# Patient Record
Sex: FEMALE | Race: WHITE | Employment: PART TIME | URBAN - NONMETROPOLITAN AREA
[De-identification: names, ages, dates, MRNs, and addresses within clinical notes are randomized per-mention and may not be internally consistent; named-entity substitution may affect disease eponyms.]

---

## 2020-07-27 ENCOUNTER — TELEPHONE (OUTPATIENT)
Dept: FAMILY MEDICINE CLINIC | Age: 45
End: 2020-07-27

## 2020-07-27 DIAGNOSIS — G43.909 MIGRAINE WITHOUT STATUS MIGRAINOSUS, NOT INTRACTABLE, UNSPECIFIED MIGRAINE TYPE: Primary | ICD-10-CM

## 2020-07-27 RX ORDER — TOPIRAMATE 25 MG/1
TABLET ORAL
Qty: 90 TAB | Refills: 3 | Status: SHIPPED | OUTPATIENT
Start: 2020-07-27 | End: 2020-07-27 | Stop reason: SDUPTHER

## 2020-07-28 RX ORDER — TOPIRAMATE 25 MG/1
25 TABLET ORAL DAILY
Qty: 90 TAB | Refills: 1 | Status: SHIPPED | OUTPATIENT
Start: 2020-07-28 | End: 2020-12-15

## 2020-09-29 ENCOUNTER — OFFICE VISIT (OUTPATIENT)
Dept: FAMILY MEDICINE CLINIC | Age: 45
End: 2020-09-29
Payer: MEDICAID

## 2020-09-29 DIAGNOSIS — R52 BODY ACHES: ICD-10-CM

## 2020-09-29 DIAGNOSIS — Z20.822 SUSPECTED COVID-19 VIRUS INFECTION: ICD-10-CM

## 2020-09-29 DIAGNOSIS — U07.1 COVID-19: Primary | ICD-10-CM

## 2020-09-29 PROCEDURE — 99213 OFFICE O/P EST LOW 20 MIN: CPT | Performed by: INTERNAL MEDICINE

## 2020-09-29 NOTE — PROGRESS NOTES
Roxanngladys Connor presents today for   Chief Complaint   Patient presents with    Concern For COVID-19 (Coronavirus)     loiss of smell and taste , congestion, sneezing, vomiting    Sore Throat    Cough    Headache    Shortness of Breath       Is someone accompanying this pt? no    Is the patient using any DME equipment during OV? no    Health Maintenance reviewed and discussed and ordered per Provider. Health Maintenance Due   Topic Date Due    DTaP/Tdap/Td series (1 - Tdap) 08/30/1995    PAP AKA CERVICAL CYTOLOGY  08/30/1995    Lipid Screen  08/30/2014    Flu Vaccine (1) 09/01/2020   . Coordination of Care:  1. Have you been to the ER, urgent care clinic since your last visit? Hospitalized since your last visit? No     2. Have you seen or consulted any other health care providers outside of the [address] [place] [address] since your last visit? Include any pap smears or colon screening.  no

## 2020-09-29 NOTE — PROGRESS NOTES
This is a very pleasant patient who was seen in clinic today for an acute visit. Assessment/Plan:     1. Cough/Covid 19  -Patient has already been using NyQuil and DayQuil and drinking plenty of fluids. She is not having any chest pain shortness of breath or other symptoms to suggest a severe course. At this time we will check a COVID swab on her and quarantine her at home for the next 10 days  Reviewed with him that COVID-19 pandemic is an evolving situation with rapidly changing recommendations & guidelines. Medical decisions are made based on the the best information available at the time. Recommended he stay tuned for updates published by trusted sources and to advise your PCP of any unexpected changes in clinical condition       Cc: cough, body aches   Subjective:   HPI     Pleasant 79-year-old female who was exposed to a known positive about a week ago. She is subsequently developed the acute onset of loss of smell scratchy throat body aches fevers and chills. She is also been experiencing a little bit of nausea as well. She has no other new sick contacts at home she was the first to develop symptoms. Recent Travel Screening and Travel History documentation      ROS  - GEN: no weight gain/loss, +fevers or chills  - HEENT: no vision changes, no tinnitus, no sore throat  - CV: no cp, palpitations or edema  - RESP: no sob, cough  - ABD: no n/v/d, no blood in stool  - : no dysuria or changes in freq. - SKIN: no rashes, ulcers  - Neuro: no resting tremors, parasthesia in extremities, no headaches  - MS: No weakness in extremities, no gait abnormalities  - Other    Temp 97, hr 96, oxygen 99     Physical Exam  Constitutional:       Appearance: Normal appearance. NAD and pleasant  HENT:      Head: Normocephalic. Nose: Nose normal.    Eyes:      Extraocular Movements: Extraocular movements intact.       Conjunctiva/sclera: Conjunctivae normal. Sclera anicteric     Pupils: Pupils are equal, round, and reactive to light. Cardiovascular:      Rate and Rhythm: tachycardic. Pulses: Normal pulses. Pulmonary:      Effort: No respiratory distress. Breath sounds: CTAB and No stridor. No rhonchi. Current Outpatient Medications on File Prior to Visit   Medication Sig Dispense Refill    topiramate (TOPAMAX) 25 mg tablet Take 1 Tab by mouth daily. 90 Tab 1     No current facility-administered medications on file prior to visit. Disclaimer:  Discussed expected course/resolution/complications of diagnosis in detail with patient. Medication risks/benefits/costs/interactions/alternatives discussed with patient. He was given an after visit summary which includes diagnoses, current medications, & vitals. He expressed understanding with the diagnosis and plan.

## 2020-10-01 ENCOUNTER — TELEPHONE (OUTPATIENT)
Dept: FAMILY MEDICINE CLINIC | Age: 45
End: 2020-10-01

## 2020-10-01 LAB — SARS-COV-2, NAA: NOT DETECTED

## 2020-10-20 RX ORDER — ONDANSETRON 8 MG/1
TABLET, ORALLY DISINTEGRATING ORAL
Qty: 30 TAB | Refills: 5 | Status: SHIPPED | OUTPATIENT
Start: 2020-10-20 | End: 2022-02-14

## 2020-10-21 RX ORDER — OMEPRAZOLE 40 MG/1
CAPSULE, DELAYED RELEASE ORAL
Qty: 30 CAP | Refills: 5 | Status: SHIPPED | OUTPATIENT
Start: 2020-10-21 | End: 2020-12-15 | Stop reason: SDUPTHER

## 2020-11-11 ENCOUNTER — VIRTUAL VISIT (OUTPATIENT)
Dept: FAMILY MEDICINE CLINIC | Age: 45
End: 2020-11-11
Payer: MEDICAID

## 2020-11-11 DIAGNOSIS — R07.9 CHEST PAIN, UNSPECIFIED TYPE: ICD-10-CM

## 2020-11-11 DIAGNOSIS — F41.9 ANXIETY: ICD-10-CM

## 2020-11-11 DIAGNOSIS — F32.9 MAJOR DEPRESSIVE DISORDER WITH CURRENT ACTIVE EPISODE, UNSPECIFIED DEPRESSION EPISODE SEVERITY, UNSPECIFIED WHETHER RECURRENT: Primary | ICD-10-CM

## 2020-11-11 DIAGNOSIS — F41.1 GAD (GENERALIZED ANXIETY DISORDER): ICD-10-CM

## 2020-11-11 PROCEDURE — 99443 PR PHYS/QHP TELEPHONE EVALUATION 21-30 MIN: CPT | Performed by: NURSE PRACTITIONER

## 2020-11-11 RX ORDER — FLUOXETINE HYDROCHLORIDE 20 MG/1
20 CAPSULE ORAL
Qty: 30 CAP | Refills: 0 | Status: SHIPPED | OUTPATIENT
Start: 2020-11-11 | End: 2020-12-11 | Stop reason: SDUPTHER

## 2020-11-11 RX ORDER — BUSPIRONE HYDROCHLORIDE 7.5 MG/1
7.5 TABLET ORAL 2 TIMES DAILY
Qty: 60 TAB | Refills: 0 | Status: SHIPPED | OUTPATIENT
Start: 2020-11-11 | End: 2020-12-11

## 2020-11-11 NOTE — PROGRESS NOTES
Frida Martines presents today for   Chief Complaint   Patient presents with    Anxiety         Depression Screening:  3 most recent PHQ Screens 11/11/2020   Little interest or pleasure in doing things Nearly every day   Feeling down, depressed, irritable, or hopeless Nearly every day   Total Score PHQ 2 6   Trouble falling or staying asleep, or sleeping too much Nearly every day   Feeling tired or having little energy Nearly every day   Poor appetite, weight loss, or overeating Nearly every day   Feeling bad about yourself - or that you are a failure or have let yourself or your family down Nearly every day   Trouble concentrating on things such as school, work, reading, or watching TV Nearly every day   Moving or speaking so slowly that other people could have noticed; or the opposite being so fidgety that others notice Nearly every day   Thoughts of being better off dead, or hurting yourself in some way Not at all   PHQ 9 Score 24   How difficult have these problems made it for you to do your work, take care of your home and get along with others Extremely difficult       Learning Assessment:  Learning Assessment 11/11/2020   PRIMARY LEARNER Patient   PRIMARY LANGUAGE ENGLISH   LEARNER PREFERENCE PRIMARY DEMONSTRATION   ANSWERED BY patient   RELATIONSHIP SELF       Health Maintenance reviewed and discussed and ordered per Provider. Health Maintenance Due   Topic Date Due    DTaP/Tdap/Td series (1 - Tdap) 08/30/1995    PAP AKA CERVICAL CYTOLOGY  08/30/1995    Lipid Screen  08/30/2014    Flu Vaccine (1) 09/01/2020   . Coordination of Care:  1. Have you been to the ER, urgent care clinic since your last visit? Hospitalized since your last visit? no    2. Have you seen or consulted any other health care providers outside of the [address] [place] [address] since your last visit? Include any pap smears or colon screening.  no    Providers orders his own labs, orders for colonoscopy, mammograms and referrals as Results for orders placed or performed in visit on 03/19/18   AMB POC HEMOGLOBIN (HGB)   Result Value Ref Range    Hemoglobin (POC) 12.1 needed    Last UDS Checked no  Last Pain contract signed: no    Patient is having a lot of depression at this time and needs to talk about getting help.

## 2020-12-07 ENCOUNTER — HOSPITAL ENCOUNTER (OUTPATIENT)
Dept: LAB | Age: 45
Discharge: HOME | End: 2020-12-07

## 2020-12-07 ENCOUNTER — HOSPITAL ENCOUNTER (OUTPATIENT)
Dept: NON INVASIVE DIAGNOSTICS | Age: 45
Discharge: HOME | End: 2020-12-07
Payer: MEDICAID

## 2020-12-07 VITALS
SYSTOLIC BLOOD PRESSURE: 121 MMHG | WEIGHT: 156 LBS | DIASTOLIC BLOOD PRESSURE: 81 MMHG | BODY MASS INDEX: 25.99 KG/M2 | HEIGHT: 65 IN

## 2020-12-07 DIAGNOSIS — R07.9 CHEST PAIN, UNSPECIFIED TYPE: ICD-10-CM

## 2020-12-07 DIAGNOSIS — F41.9 ANXIETY: ICD-10-CM

## 2020-12-07 PROBLEM — K21.9 GERD WITHOUT ESOPHAGITIS: Status: ACTIVE | Noted: 2020-12-07

## 2020-12-07 LAB
ATRIAL RATE: 64 BPM
CALCULATED P AXIS, ECG09: 22 DEGREES
CALCULATED R AXIS, ECG10: 44 DEGREES
CALCULATED T AXIS, ECG11: 40 DEGREES
DIAGNOSIS, 93000: NORMAL
P-R INTERVAL, ECG05: 164 MS
Q-T INTERVAL, ECG07: 396 MS
QRS DURATION, ECG06: 70 MS
QTC CALCULATION (BEZET), ECG08: 408 MS
VENTRICULAR RATE, ECG03: 64 BPM

## 2020-12-07 PROCEDURE — 93005 ELECTROCARDIOGRAM TRACING: CPT

## 2020-12-09 DIAGNOSIS — F41.1 GAD (GENERALIZED ANXIETY DISORDER): ICD-10-CM

## 2020-12-09 NOTE — TELEPHONE ENCOUNTER
Patient wanted to call and let you know that her medication has been working. Patient requested refills of medication. Pharmacy on file verified.

## 2020-12-11 ENCOUNTER — TELEPHONE (OUTPATIENT)
Dept: FAMILY MEDICINE CLINIC | Age: 45
End: 2020-12-11

## 2020-12-11 DIAGNOSIS — F32.9 MAJOR DEPRESSIVE DISORDER WITH CURRENT ACTIVE EPISODE, UNSPECIFIED DEPRESSION EPISODE SEVERITY, UNSPECIFIED WHETHER RECURRENT: ICD-10-CM

## 2020-12-11 RX ORDER — FLUOXETINE HYDROCHLORIDE 20 MG/1
20 CAPSULE ORAL
Qty: 30 CAP | Refills: 0 | Status: SHIPPED | OUTPATIENT
Start: 2020-12-11 | End: 2020-12-15 | Stop reason: SDUPTHER

## 2020-12-11 RX ORDER — BUSPIRONE HYDROCHLORIDE 7.5 MG/1
7.5 TABLET ORAL 2 TIMES DAILY
Qty: 60 TAB | Refills: 0 | OUTPATIENT
Start: 2020-12-11 | End: 2021-01-10

## 2020-12-15 ENCOUNTER — VIRTUAL VISIT (OUTPATIENT)
Dept: FAMILY MEDICINE CLINIC | Age: 45
End: 2020-12-15
Payer: MEDICAID

## 2020-12-15 DIAGNOSIS — M50.30 DEGENERATION OF CERVICAL INTERVERTEBRAL DISC: ICD-10-CM

## 2020-12-15 DIAGNOSIS — K21.9 GERD WITHOUT ESOPHAGITIS: ICD-10-CM

## 2020-12-15 DIAGNOSIS — F41.1 GAD (GENERALIZED ANXIETY DISORDER): ICD-10-CM

## 2020-12-15 DIAGNOSIS — F32.9 MAJOR DEPRESSIVE DISORDER WITH CURRENT ACTIVE EPISODE, UNSPECIFIED DEPRESSION EPISODE SEVERITY, UNSPECIFIED WHETHER RECURRENT: Primary | ICD-10-CM

## 2020-12-15 PROCEDURE — 99443 PR PHYS/QHP TELEPHONE EVALUATION 21-30 MIN: CPT | Performed by: NURSE PRACTITIONER

## 2020-12-15 RX ORDER — CYCLOBENZAPRINE HCL 10 MG
10 TABLET ORAL
Qty: 60 TAB | Refills: 0 | Status: CANCELLED | OUTPATIENT
Start: 2020-12-15

## 2020-12-15 RX ORDER — BUSPIRONE HYDROCHLORIDE 7.5 MG/1
7.5 TABLET ORAL 2 TIMES DAILY
COMMUNITY
End: 2020-12-15 | Stop reason: SDUPTHER

## 2020-12-15 RX ORDER — OMEPRAZOLE 40 MG/1
CAPSULE, DELAYED RELEASE ORAL
Qty: 30 CAP | Refills: 5 | Status: SHIPPED | OUTPATIENT
Start: 2020-12-15 | End: 2021-06-25

## 2020-12-15 RX ORDER — FLUOXETINE HYDROCHLORIDE 20 MG/1
20 CAPSULE ORAL
Qty: 30 CAP | Refills: 5 | Status: SHIPPED | OUTPATIENT
Start: 2020-12-15 | End: 2022-02-14

## 2020-12-15 RX ORDER — BUSPIRONE HYDROCHLORIDE 7.5 MG/1
7.5 TABLET ORAL 2 TIMES DAILY
Qty: 60 TAB | Refills: 5 | Status: SHIPPED | OUTPATIENT
Start: 2020-12-15 | End: 2021-06-07

## 2020-12-15 NOTE — PROGRESS NOTES
Delmy Orosco presents today for   Chief Complaint   Patient presents with    GERD     f/u    Anxiety       Depression Screening:  3 most recent PHQ Screens 11/11/2020   Little interest or pleasure in doing things Nearly every day   Feeling down, depressed, irritable, or hopeless Nearly every day   Total Score PHQ 2 6   Trouble falling or staying asleep, or sleeping too much Nearly every day   Feeling tired or having little energy Nearly every day   Poor appetite, weight loss, or overeating Nearly every day   Feeling bad about yourself - or that you are a failure or have let yourself or your family down Nearly every day   Trouble concentrating on things such as school, work, reading, or watching TV Nearly every day   Moving or speaking so slowly that other people could have noticed; or the opposite being so fidgety that others notice Nearly every day   Thoughts of being better off dead, or hurting yourself in some way Not at all   PHQ 9 Score 24   How difficult have these problems made it for you to do your work, take care of your home and get along with others Extremely difficult       Learning Assessment:  Learning Assessment 11/11/2020   PRIMARY LEARNER Patient   PRIMARY LANGUAGE ENGLISH   LEARNER PREFERENCE PRIMARY DEMONSTRATION   ANSWERED BY patient   RELATIONSHIP SELF       Health Maintenance reviewed and discussed and ordered per Provider. Health Maintenance Due   Topic Date Due    PAP AKA CERVICAL CYTOLOGY  08/30/1995    Lipid Screen  08/30/2014    Flu Vaccine (1) 09/01/2020   . Coordination of Care:  1. Have you been to the ER, urgent care clinic since your last visit? Hospitalized since your last visit? no    2. Have you seen or consulted any other health care providers outside of the [address] [place] [address] since your last visit? Include any pap smears or colon screening.  no

## 2020-12-15 NOTE — PROGRESS NOTES
I am seeing this patient today virtually using HIPAA-compliant video-conferencing technology due to the COVID-19 Pandemic. The patient has previously provided full consent to use this technology and understands the risks and benefits of proceeding. I am seeing the patient today from my office in [facility]. The patient is in his/her home located within [place]. Patient already made aware that this is a virtual visit with provider and that for the purposes of billing, we will submit a claim for reimbursement with your insurance company. He/She was informed that he/she will be responsible for any copays, coinsurance amounts or other amounts not covered by his/her insurance company. Patient consented and accepted terms of virtual visit. THIS VISIT WAS CONDUCTED OVER THE TELEPHONE    HPI    Kristen Doe is a 54 y.o. female and presents today for GERD (f/u) and Anxiety  A month ago we started her on buspirone and fluoxetine for depression/anxiety. She reports doing well with both medications. She denies side effects. She denies thoughts of suicide. Need refills. She takes ppi for gerd; she states that other than those 3 things, she takes no other medication. Allergies    Allergies   Allergen Reactions    Sulfa (Sulfonamide Antibiotics) Other (comments)     Abdominal pain        Medications    Current Outpatient Medications   Medication Sig Dispense    busPIRone (BUSPAR) 7.5 mg tablet Take 1 Tab by mouth two (2) times a day. 60 Tab    FLUoxetine (PROzac) 20 mg capsule Take 1 Cap by mouth nightly. 30 Cap    omeprazole (PRILOSEC) 40 mg capsule TAKE ONE CAPSULE BY MOUTH DAILY 30 Cap    ondansetron (ZOFRAN ODT) 8 mg disintegrating tablet PLACE 1 TABLET ON TOP OF TONGUE WHERE IT WILL DISSOLVE, THEN SWALLOW THREE TIMES DAILY FOR 10 DAYS 30 Tab     No current facility-administered medications for this visit.          Screening  Current dx; stable    Health Maintenance    Health Maintenance Due   Topic Date Due    PAP AKA CERVICAL CYTOLOGY  1995    Lipid Screen  2014    Flu Vaccine (1) 2020        Problem List    Patient Active Problem List    Diagnosis Date Noted    Degeneration of cervical intervertebral disc 12/15/2020    GERD without esophagitis 2020    CRISTOFER (generalized anxiety disorder) 2020    Major depressive disorder with current active episode 2020        Family Hx    Family History   Problem Relation Age of Onset    No Known Problems Mother     Alcohol abuse Father         Social Hx    Social History     Socioeconomic History    Marital status:      Spouse name: Not on file    Number of children: Not on file    Years of education: Not on file    Highest education level: Not on file   Tobacco Use    Smoking status: Former Smoker     Packs/day: 0.25     Last attempt to quit:      Years since quittin.9    Smokeless tobacco: Never Used   Substance and Sexual Activity    Alcohol use: Never     Frequency: Never     Comment: social        Surgical Hx    Past Surgical History:   Procedure Laterality Date    HX CHOLECYSTECTOMY      HX HYSTERECTOMY  2019        Vitals    There were no vitals taken for this visit. No flowsheet data found. ROS    Review of Systems   Constitutional: Negative for chills, fever and malaise/fatigue. HENT: Negative for congestion, ear pain, sinus pain and sore throat. Eyes: Negative for blurred vision and redness. Respiratory: Negative for cough, sputum production, shortness of breath and wheezing. Cardiovascular: Negative for chest pain, palpitations and leg swelling. Gastrointestinal: Negative for abdominal pain, constipation, diarrhea, heartburn, nausea and vomiting. Genitourinary: Negative for dysuria and hematuria. Musculoskeletal: Positive for joint pain. Negative for falls and myalgias. Skin: Negative for rash.    Neurological: Negative for dizziness, seizures, weakness and headaches. Endo/Heme/Allergies: Does not bruise/bleed easily. Psychiatric/Behavioral: Positive for depression. Negative for suicidal ideas. The patient is nervous/anxious. The patient does not have insomnia. Physical Exam    Patient is a 54y.o. year old female     Physical exam was deferred due to [place]- 19 precautions as visit was completed via telemedicine. Assessment/Plan  1. Major depressive disorder with current active episode, unspecified depression episode severity, unspecified whether recurrent  Patient to continue current medication regimen and was advised to call 911 if there are any developments of suicidal/homicidal ideation. Patient verbalized understanding.   - FLUoxetine (PROzac) 20 mg capsule; Take 1 Cap by mouth nightly. Dispense: 30 Cap; Refill: 5    2. Degeneration of cervical intervertebral disc  Stable at this time; is no longer taking Topamax and does not use Flexeril    3. CRISTOFER (generalized anxiety disorder)  Patient to continue current medication regimen and was advised to call 911 if there are any developments of suicidal/homicidal ideation. Patient verbalized understanding.   - busPIRone (BUSPAR) 7.5 mg tablet; Take 1 Tab by mouth two (2) times a day. Dispense: 60 Tab; Refill: 5    4. GERD without esophagitis  Continue current PPI therapy; Discussed at length to avoid very acidic foods, alcohol, fatty food, onions, mints and caffeine as well as foods that aggravate symptoms. The patient was advised to also avoid eating late before going to bed, and would sleep with head elevated. - omeprazole (PRILOSEC) 40 mg capsule; TAKE ONE CAPSULE BY MOUTH DAILY  Dispense: 30 Cap; Refill: 5         Health Maintenance Items reviewed with patient as noted. 21 minutes spent with patient/reviewing records during virtual appt discussing diagnoses, treatment options, and answering any patient questions.     Jigar Baez, MSN, FNP-BC   [facility]

## 2021-01-20 ENCOUNTER — TELEPHONE (OUTPATIENT)
Dept: FAMILY MEDICINE CLINIC | Age: 46
End: 2021-01-20

## 2021-02-09 ENCOUNTER — VIRTUAL VISIT (OUTPATIENT)
Dept: FAMILY MEDICINE CLINIC | Age: 46
End: 2021-02-09
Payer: MEDICAID

## 2021-02-09 DIAGNOSIS — E66.9 CLASS 1 OBESITY WITH BODY MASS INDEX (BMI) OF 30.0 TO 30.9 IN ADULT, UNSPECIFIED OBESITY TYPE, UNSPECIFIED WHETHER SERIOUS COMORBIDITY PRESENT: ICD-10-CM

## 2021-02-09 DIAGNOSIS — R63.4 WEIGHT LOSS: Primary | ICD-10-CM

## 2021-02-09 DIAGNOSIS — Z71.3 WEIGHT LOSS COUNSELING, ENCOUNTER FOR: ICD-10-CM

## 2021-02-09 PROCEDURE — 99443 PR PHYS/QHP TELEPHONE EVALUATION 21-30 MIN: CPT | Performed by: NURSE PRACTITIONER

## 2021-02-09 RX ORDER — PHENTERMINE HYDROCHLORIDE 37.5 MG/1
37.5 TABLET ORAL
Qty: 30 TAB | Refills: 0 | Status: SHIPPED | OUTPATIENT
Start: 2021-02-09 | End: 2021-03-09 | Stop reason: SDUPTHER

## 2021-02-09 NOTE — PROGRESS NOTES
I am seeing this patient today virtually using HIPAA-compliant video-conferencing technology due to the COVID-19 Pandemic. The patient has previously provided full consent to use this technology and understands the risks and benefits of proceeding. I am seeing the patient today from my office in [place], [place]. The patient is in his/her home located within [place]. Patient already made aware that this is a virtual visit/telehealth with provider and that for the purposes of billing, we will submit a claim for reimbursement with your insurance company. He/She was informed that he/she will be responsible for any copays, coinsurance amounts or other amounts not covered by his/her insurance company. Patient consented and accepted terms of virtual visit. THIS VISIT WAS CONDUCTED OVER THE TELEPHONE    HPI  Angel Mercer is a 54 y.o. female and presents today for Weight Loss (clinic)  Presents today for wanting to lose weight; she is used phentermine intermittently in the past for weight loss. She is wanting to go back on it at this time. Her goal is to lose 20lbs  Ht: 5'4  Allergies  Allergies   Allergen Reactions    Sulfa (Sulfonamide Antibiotics) Other (comments)     Abdominal pain        Medications  Current Outpatient Medications   Medication Sig Dispense    phentermine (ADIPEX-P) 37.5 mg tablet Take 1 Tab by mouth every morning. Max Daily Amount: 37.5 mg. 30 Tab    busPIRone (BUSPAR) 7.5 mg tablet Take 1 Tab by mouth two (2) times a day. 60 Tab    FLUoxetine (PROzac) 20 mg capsule Take 1 Cap by mouth nightly. 30 Cap    omeprazole (PRILOSEC) 40 mg capsule TAKE ONE CAPSULE BY MOUTH DAILY 30 Cap    ondansetron (ZOFRAN ODT) 8 mg disintegrating tablet PLACE 1 TABLET ON TOP OF TONGUE WHERE IT WILL DISSOLVE, THEN SWALLOW THREE TIMES DAILY FOR 10 DAYS 30 Tab     No current facility-administered medications for this visit. Screening  On the basis of positive PHQ-9 screening ( ), C-SSRS completed. Patient will follow-up as needed/as directed with PCP. Health Maintenance  Health Maintenance Due   Topic Date Due    PAP AKA CERVICAL CYTOLOGY  1995    Lipid Screen  2014    Flu Vaccine (1) 2020        Problem List  Patient Active Problem List    Diagnosis Date Noted    Weight loss 2021    Weight loss counseling, encounter for 2021    Class 1 obesity with body mass index (BMI) of 30.0 to 30.9 in adult 2021    Degeneration of cervical intervertebral disc 12/15/2020    GERD without esophagitis 2020    CRISTOFER (generalized anxiety disorder) 2020    Major depressive disorder with current active episode 2020        Family Hx  Family History   Problem Relation Age of Onset    No Known Problems Mother     Alcohol abuse Father         Social Hx  Social History     Socioeconomic History    Marital status:      Spouse name: Not on file    Number of children: Not on file    Years of education: Not on file    Highest education level: Not on file   Tobacco Use    Smoking status: Former Smoker     Packs/day: 0.25     Quit date:      Years since quittin.1    Smokeless tobacco: Never Used   Substance and Sexual Activity    Alcohol use: Never     Frequency: Never     Comment: social    Drug use: Never        Surgical Hx  Past Surgical History:   Procedure Laterality Date    HX CHOLECYSTECTOMY      HX HYSTERECTOMY  2019        Vitals  There were no vitals taken for this visit. Patient-Reported Vitals 2021   Patient-Reported Weight 177   Patient-Reported Pulse 95   Patient-Reported Systolic  071   Patient-Reported Diastolic 82        ROS  Review of Systems   Constitutional: Negative for chills, fever and malaise/fatigue. HENT: Negative for congestion, ear pain, sinus pain and sore throat. Eyes: Negative for blurred vision and redness. Respiratory: Negative for cough, sputum production, shortness of breath and wheezing. Cardiovascular: Negative for chest pain, palpitations and leg swelling. Gastrointestinal: Negative for abdominal pain, constipation, diarrhea, heartburn, nausea and vomiting. Genitourinary: Negative for dysuria and hematuria. Musculoskeletal: Negative for falls, joint pain and myalgias. Skin: Negative for rash. Neurological: Negative for dizziness, seizures, weakness and headaches. Endo/Heme/Allergies: Does not bruise/bleed easily. Psychiatric/Behavioral: Negative for depression and suicidal ideas. The patient does not have insomnia. Physical Exam  Patient is a 54y.o. year old female     Physical exam was deferred due to [place]- 19 precautions as visit was completed via telemedicine. Assessment/Plan  1. Weight loss  Discussed weight loss goals with patient , areas for improvement and lifestyle modifications; We discussed dietary modifications in significant detail; I have asked her to follow a low carb, low sugar diet. I have also asked her to continue HEP; We discussed how to appropriately use phentermine; SEs discussed with patient. She would like to proceed. Will Rx 30days (at a time); if she is showing progress, will cont 30days at a time; we discussed strict monitoring of her blood pressure, heart rate and weight loss. Should she experience any chest pain, SOB, she is to go to the ER. We will continue wt loss medication therapy for no longer than 4months. - phentermine (ADIPEX-P) 37.5 mg tablet; Take 1 Tab by mouth every morning. Max Daily Amount: 37.5 mg.  Dispense: 30 Tab; Refill: 0    2. Weight loss counseling, encounter for  Discussed weight loss goals with patient , areas for improvement and lifestyle modifications; We discussed dietary modifications in significant detail; I have asked her to follow a low carb, low sugar diet. I have also asked her to continue HEP; We discussed how to appropriately use phentermine; SEs discussed with patient.  She would like to proceed. Will Rx 30days (at a time); if she is showing progress, will cont 30days at a time; we discussed strict monitoring of her blood pressure, heart rate and weight loss. Should she experience any chest pain, SOB, she is to go to the ER. We will continue wt loss medication therapy for no longer than 4months. - phentermine (ADIPEX-P) 37.5 mg tablet; Take 1 Tab by mouth every morning. Max Daily Amount: 37.5 mg.  Dispense: 30 Tab; Refill: 0    3. Class 1 obesity with body mass index (BMI) of 30.0 to 30.9 in adult, unspecified obesity type, unspecified whether serious comorbidity present  BMI 30.4  Healthy diet/nutrition reviewed at length. Limit fast foods, FFF, soft drinks, late night eating, watch portion control. Smaller, more frequent meals/day. Work to increase daily physical activity as tolerated-goal 30 minutes at least 5 days a week. - phentermine (ADIPEX-P) 37.5 mg tablet; Take 1 Tab by mouth every morning. Max Daily Amount: 37.5 mg.  Dispense: 30 Tab; Refill: 0         Health Maintenance Items reviewed with patient; updates completed if applicable. >21 minutes spent with patient/reviewing records during virtual appt discussing diagnoses, treatment options, and answering any patient questions. Disclaimer:  I have discussed the diagnosis with the patient and the intended plan as seen above. The patient understands our medical plan. The risks, benefits and significant side effects of all medications have been reviewed. Anticipated time course and progression of condition reviewed. All questions have been addressed. He received an after visit summary, with information reviewed, and questions answered. Where appropriate, he/she is instructed to call the clinic if he/she has not been notified either by phone or through [address] with the results of his/her tests or with an appointment plan for any referrals within 1 week(s).  The patient  is to call if his condition worsens or fails to improve or if significant side effects are experienced.      Mickie Nicholas, FNP-BC

## 2021-02-09 NOTE — PROGRESS NOTES
Lobo Parham presents today for   Chief Complaint   Patient presents with    Weight Loss     clinic       Depression Screening:  3 most recent PHQ Screens 2/9/2021   Little interest or pleasure in doing things Not at all   Feeling down, depressed, irritable, or hopeless Not at all   Total Score PHQ 2 0   Trouble falling or staying asleep, or sleeping too much -   Feeling tired or having little energy -   Poor appetite, weight loss, or overeating -   Feeling bad about yourself - or that you are a failure or have let yourself or your family down -   Trouble concentrating on things such as school, work, reading, or watching TV -   Moving or speaking so slowly that other people could have noticed; or the opposite being so fidgety that others notice -   Thoughts of being better off dead, or hurting yourself in some way -   PHQ 9 Score -   How difficult have these problems made it for you to do your work, take care of your home and get along with others -       Learning Assessment:  Learning Assessment 11/11/2020   PRIMARY LEARNER Patient   PRIMARY LANGUAGE ENGLISH   LEARNER PREFERENCE PRIMARY DEMONSTRATION   ANSWERED BY patient   RELATIONSHIP SELF       Health Maintenance reviewed and discussed and ordered per Provider. Health Maintenance Due   Topic Date Due    PAP AKA CERVICAL CYTOLOGY  08/30/1995    Lipid Screen  08/30/2014    Flu Vaccine (1) 09/01/2020   . Coordination of Care:  1. Have you been to the ER, urgent care clinic since your last visit? Hospitalized since your last visit? no    2. Have you seen or consulted any other health care providers outside of the [address] [place] [address] since your last visit? Include any pap smears or colon screening.  no

## 2021-03-09 ENCOUNTER — VIRTUAL VISIT (OUTPATIENT)
Dept: FAMILY MEDICINE CLINIC | Age: 46
End: 2021-03-09
Payer: MEDICAID

## 2021-03-09 DIAGNOSIS — Z71.3 WEIGHT LOSS COUNSELING, ENCOUNTER FOR: ICD-10-CM

## 2021-03-09 DIAGNOSIS — R63.4 WEIGHT LOSS: Primary | ICD-10-CM

## 2021-03-09 DIAGNOSIS — E66.9 CLASS 1 OBESITY WITH BODY MASS INDEX (BMI) OF 30.0 TO 30.9 IN ADULT, UNSPECIFIED OBESITY TYPE, UNSPECIFIED WHETHER SERIOUS COMORBIDITY PRESENT: ICD-10-CM

## 2021-03-09 PROCEDURE — 99443 PR PHYS/QHP TELEPHONE EVALUATION 21-30 MIN: CPT | Performed by: NURSE PRACTITIONER

## 2021-03-09 RX ORDER — PHENTERMINE HYDROCHLORIDE 37.5 MG/1
37.5 TABLET ORAL
Qty: 30 TAB | Refills: 0 | Status: SHIPPED | OUTPATIENT
Start: 2021-03-09 | End: 2021-04-13 | Stop reason: SDUPTHER

## 2021-03-09 NOTE — PROGRESS NOTES
I am seeing this patient today virtually using HIPAA-compliant video-conferencing technology due to the COVID-19 Pandemic. The patient has previously provided full consent to use this technology and understands the risks and benefits of proceeding. I am seeing the patient today from my office in [place], [place]. The patient is in his/her home located within [place]. Patient already made aware that this is a virtual visit/telehealth with provider and that for the purposes of billing, we will submit a claim for reimbursement with your insurance company. He/She was informed that he/she will be responsible for any copays, coinsurance amounts or other amounts not covered by his/her insurance company. Patient consented and accepted terms of virtual visit. THIS VISIT WAS CONDUCTED OVER THE TELEPHONE    HPI  Martha Pretty is a 54 y.o. female and presents today for Weight Loss  Patient presents for weight loss follow-up. She has lost about 6 to 7 pounds since last visit. She is doing well with phentermine. She denies any side effects or problems. She has no complaints today. She would Like to continue medication. Allergies  Allergies   Allergen Reactions    Sulfa (Sulfonamide Antibiotics) Other (comments)     Abdominal pain        Medications  Current Outpatient Medications   Medication Sig Dispense    phentermine (ADIPEX-P) 37.5 mg tablet Take 1 Tab by mouth every morning. Max Daily Amount: 37.5 mg. 30 Tab    busPIRone (BUSPAR) 7.5 mg tablet Take 1 Tab by mouth two (2) times a day. 60 Tab    FLUoxetine (PROzac) 20 mg capsule Take 1 Cap by mouth nightly. 30 Cap    omeprazole (PRILOSEC) 40 mg capsule TAKE ONE CAPSULE BY MOUTH DAILY 30 Cap    ondansetron (ZOFRAN ODT) 8 mg disintegrating tablet PLACE 1 TABLET ON TOP OF TONGUE WHERE IT WILL DISSOLVE, THEN SWALLOW THREE TIMES DAILY FOR 10 DAYS 30 Tab     No current facility-administered medications for this visit.          Screening  On the basis of positive PHQ-9 screening (PHQ 9 Score: 0), C-SSRS completed. Patient will follow-up as needed/as directed with PCP. Health Maintenance  Health Maintenance Due   Topic Date Due    PAP AKA CERVICAL CYTOLOGY  Never done    Lipid Screen  Never done    Flu Vaccine (1) Never done        Problem List  Patient Active Problem List    Diagnosis Date Noted    Weight loss 2021    Weight loss counseling, encounter for 2021    Class 1 obesity with body mass index (BMI) of 30.0 to 30.9 in adult 2021    Degeneration of cervical intervertebral disc 12/15/2020    GERD without esophagitis 2020    CRISTOFER (generalized anxiety disorder) 2020    Major depressive disorder with current active episode 2020        Family Hx  Family History   Problem Relation Age of Onset    No Known Problems Mother     Alcohol abuse Father         Social Hx  Social History     Socioeconomic History    Marital status:      Spouse name: Not on file    Number of children: Not on file    Years of education: Not on file    Highest education level: Not on file   Tobacco Use    Smoking status: Former Smoker     Packs/day: 0.25     Quit date:      Years since quittin.2    Smokeless tobacco: Never Used   Substance and Sexual Activity    Alcohol use: Never     Frequency: Never     Comment: social    Drug use: Never        Surgical Hx  Past Surgical History:   Procedure Laterality Date    HX CHOLECYSTECTOMY      HX HYSTERECTOMY  2019        Vitals  There were no vitals taken for this visit. Patient-Reported Vitals 3/9/2021   Patient-Reported Weight 170   Patient-Reported Pulse 85   Patient-Reported SpO2 97   Patient-Reported Systolic  003   Patient-Reported Diastolic 89        ROS  Review of Systems   Constitutional: Negative for chills, fever and malaise/fatigue. HENT: Negative for congestion, ear pain, sinus pain and sore throat. Eyes: Negative for blurred vision and redness.    Respiratory: Negative for cough, sputum production, shortness of breath and wheezing. Cardiovascular: Negative for chest pain, palpitations and leg swelling. Gastrointestinal: Negative for abdominal pain, constipation, diarrhea, heartburn, nausea and vomiting. Genitourinary: Negative for dysuria and hematuria. Musculoskeletal: Negative for falls, joint pain and myalgias. Skin: Negative for rash. Neurological: Negative for dizziness, seizures, weakness and headaches. Endo/Heme/Allergies: Does not bruise/bleed easily. Psychiatric/Behavioral: Negative for depression and suicidal ideas. The patient does not have insomnia. Physical Exam  Patient is a 54y.o. year old female     Physical exam was deferred due to [place]- 19 precautions as visit was completed via telemedicine. Assessment/Plan  1. Weight loss  Rx#2  She has lost about 7lbs since last visit!!! Discussed weight loss goals with patient , areas for improvement and lifestyle modifications; We discussed dietary modifications in significant detail; I have asked her to follow a low carb, low sugar diet; drink at least 64oz of water each day. I have also asked her to continue HEP; We discussed how to appropriately use phentermine; SEs discussed with patient. Will cont her on phentermine; discussed strict monitoring of her blood pressure, heart rate and weight loss. Should she experience any chest pain, SOB, she is to go to the ER. We will continue wt loss medication therapy for no longer than 4months. - phentermine (ADIPEX-P) 37.5 mg tablet; Take 1 Tab by mouth every morning. Max Daily Amount: 37.5 mg.  Dispense: 30 Tab; Refill: 0    2. Weight loss counseling, encounter for  Rx#2  She has lost about 7lbs since last visit!!! Discussed weight loss goals with patient , areas for improvement and lifestyle modifications;    We discussed dietary modifications in significant detail; I have asked her to follow a low carb, low sugar diet; drink at least 64oz of water each day. I have also asked her to continue HEP; We discussed how to appropriately use phentermine; SEs discussed with patient. Will cont her on phentermine; discussed strict monitoring of her blood pressure, heart rate and weight loss. Should she experience any chest pain, SOB, she is to go to the ER. We will continue wt loss medication therapy for no longer than 4months. - phentermine (ADIPEX-P) 37.5 mg tablet; Take 1 Tab by mouth every morning. Max Daily Amount: 37.5 mg.  Dispense: 30 Tab; Refill: 0    3. Class 1 obesity with body mass index (BMI) of 30.0 to 30.9 in adult, unspecified obesity type, unspecified whether serious comorbidity present  Rx#2  She has lost about 7lbs since last visit!!! Discussed weight loss goals with patient , areas for improvement and lifestyle modifications; We discussed dietary modifications in significant detail; I have asked her to follow a low carb, low sugar diet; drink at least 64oz of water each day. I have also asked her to continue HEP; We discussed how to appropriately use phentermine; SEs discussed with patient. Will cont her on phentermine; discussed strict monitoring of her blood pressure, heart rate and weight loss. Should she experience any chest pain, SOB, she is to go to the ER. We will continue wt loss medication therapy for no longer than 4months. - phentermine (ADIPEX-P) 37.5 mg tablet; Take 1 Tab by mouth every morning. Max Daily Amount: 37.5 mg.  Dispense: 30 Tab; Refill: 0         Health Maintenance Items reviewed with patient; updates completed if applicable. >21 minutes spent with patient/reviewing records during virtual appt discussing diagnoses, treatment options, and answering any patient questions. Disclaimer:  I have discussed the diagnosis with the patient and the intended plan as seen above. The patient understands our medical plan. The risks, benefits and significant side effects of all medications have been reviewed.  Anticipated time course and progression of condition reviewed. All questions have been addressed. He received an after visit summary, with information reviewed, and questions answered. Where appropriate, he/she is instructed to call the clinic if he/she has not been notified either by phone or through [address] with the results of his/her tests or with an appointment plan for any referrals within 1 week(s). The patient  is to call if his condition worsens or fails to improve or if significant side effects are experienced.      Jordy Lopez, LINDY-BC

## 2021-04-09 DIAGNOSIS — E66.9 CLASS 1 OBESITY WITH BODY MASS INDEX (BMI) OF 30.0 TO 30.9 IN ADULT, UNSPECIFIED OBESITY TYPE, UNSPECIFIED WHETHER SERIOUS COMORBIDITY PRESENT: ICD-10-CM

## 2021-04-09 DIAGNOSIS — R63.4 WEIGHT LOSS: ICD-10-CM

## 2021-04-09 DIAGNOSIS — Z71.3 WEIGHT LOSS COUNSELING, ENCOUNTER FOR: ICD-10-CM

## 2021-04-13 ENCOUNTER — VIRTUAL VISIT (OUTPATIENT)
Dept: FAMILY MEDICINE CLINIC | Age: 46
End: 2021-04-13
Payer: MEDICAID

## 2021-04-13 DIAGNOSIS — R63.4 WEIGHT LOSS: ICD-10-CM

## 2021-04-13 DIAGNOSIS — E66.9 CLASS 1 OBESITY WITH BODY MASS INDEX (BMI) OF 30.0 TO 30.9 IN ADULT, UNSPECIFIED OBESITY TYPE, UNSPECIFIED WHETHER SERIOUS COMORBIDITY PRESENT: ICD-10-CM

## 2021-04-13 DIAGNOSIS — Z71.3 WEIGHT LOSS COUNSELING, ENCOUNTER FOR: ICD-10-CM

## 2021-04-13 PROCEDURE — 99443 PR PHYS/QHP TELEPHONE EVALUATION 21-30 MIN: CPT | Performed by: NURSE PRACTITIONER

## 2021-04-13 RX ORDER — PHENTERMINE HYDROCHLORIDE 37.5 MG/1
37.5 TABLET ORAL
Qty: 30 TAB | Refills: 0 | OUTPATIENT
Start: 2021-04-13

## 2021-04-13 RX ORDER — PHENTERMINE HYDROCHLORIDE 37.5 MG/1
37.5 TABLET ORAL
Qty: 30 TAB | Refills: 0 | Status: SHIPPED | OUTPATIENT
Start: 2021-04-13 | End: 2022-02-14

## 2021-04-13 NOTE — PROGRESS NOTES
I am seeing this patient today virtually using HIPAA-compliant video-conferencing technology due to the COVID-19 Pandemic. The patient has previously provided full consent to use this technology and understands the risks and benefits of proceeding. I am seeing the patient today from my office in [place], [place]. The patient is in his/her home located within [place]. Patient already made aware that this is a virtual visit/telehealth with provider and that for the purposes of billing, we will submit a claim for reimbursement with your insurance company. He/She was informed that he/she will be responsible for any copays, coinsurance amounts or other amounts not covered by his/her insurance company. Patient consented and accepted terms of virtual visit. THIS VISIT WAS CONDUCTED OVER THE TELEPHONE    HPI  Karthik Flores is a 54 y.o. female and presents today for Weight Loss    Patient presents for weight loss follow-up. She has lost about 2 pounds since last visit. She admits to slacking off on her water intake on a daily basis; she also admits to eating some breads as before she had cut them almost out completely. She denies any side effects or problems. She has no complaints today. She would Like to continue medication. #3 appt/refill. Allergies  Allergies   Allergen Reactions    Sulfa (Sulfonamide Antibiotics) Other (comments)     Abdominal pain        Medications  Current Outpatient Medications   Medication Sig Dispense    phentermine (ADIPEX-P) 37.5 mg tablet Take 1 Tab by mouth every morning. Max Daily Amount: 37.5 mg. 30 Tab    busPIRone (BUSPAR) 7.5 mg tablet Take 1 Tab by mouth two (2) times a day. 60 Tab    FLUoxetine (PROzac) 20 mg capsule Take 1 Cap by mouth nightly.  30 Cap    omeprazole (PRILOSEC) 40 mg capsule TAKE ONE CAPSULE BY MOUTH DAILY 30 Cap    ondansetron (ZOFRAN ODT) 8 mg disintegrating tablet PLACE 1 TABLET ON TOP OF TONGUE WHERE IT WILL DISSOLVE, THEN SWALLOW THREE TIMES DAILY FOR 10 DAYS 30 Tab     No current facility-administered medications for this visit. Screening  On the basis of positive PHQ-9 screening ( ), C-SSRS completed. Patient will follow-up as needed/as directed with PCP. Health Maintenance  Health Maintenance Due   Topic Date Due    Hepatitis C Screening  Never done    PAP AKA CERVICAL CYTOLOGY  Never done    Lipid Screen  Never done        Problem List  Patient Active Problem List    Diagnosis Date Noted    Weight loss 2021    Weight loss counseling, encounter for 2021    Class 1 obesity with body mass index (BMI) of 30.0 to 30.9 in adult 2021    Degeneration of cervical intervertebral disc 12/15/2020    GERD without esophagitis 2020    CRISTOFER (generalized anxiety disorder) 2020    Major depressive disorder with current active episode 2020        Family Hx  Family History   Problem Relation Age of Onset    No Known Problems Mother     Alcohol abuse Father         Social Hx  Social History     Socioeconomic History    Marital status:      Spouse name: Not on file    Number of children: Not on file    Years of education: Not on file    Highest education level: Not on file   Tobacco Use    Smoking status: Former Smoker     Packs/day: 0.25     Quit date:      Years since quittin.2    Smokeless tobacco: Never Used   Substance and Sexual Activity    Alcohol use: Never     Frequency: Never     Comment: social    Drug use: Never        Surgical Hx  Past Surgical History:   Procedure Laterality Date    HX CHOLECYSTECTOMY      HX HYSTERECTOMY  2019        Vitals  There were no vitals taken for this visit. Patient-Reported Vitals 2021   Patient-Reported Weight 168   Patient-Reported Pulse 95   Patient-Reported SpO2 96   Patient-Reported Systolic  737   Patient-Reported Diastolic 83        ROS  Review of Systems   Constitutional: Negative for chills, fever and malaise/fatigue. HENT: Negative for congestion, ear pain, sinus pain and sore throat. Eyes: Negative for blurred vision and redness. Respiratory: Negative for cough, sputum production, shortness of breath and wheezing. Cardiovascular: Negative for chest pain, palpitations and leg swelling. Gastrointestinal: Negative for abdominal pain, constipation, diarrhea, heartburn, nausea and vomiting. Genitourinary: Negative for dysuria and hematuria. Musculoskeletal: Negative for falls, joint pain and myalgias. Skin: Negative for rash. Neurological: Negative for dizziness, seizures, weakness and headaches. Endo/Heme/Allergies: Does not bruise/bleed easily. Psychiatric/Behavioral: Negative for depression and suicidal ideas. The patient does not have insomnia. Physical Exam  Patient is a 54y.o. year old female     Physical exam was deferred due to [facility]-  precautions as visit was completed via telemedicine. Assessment/Plan  1. Weight loss  She has lost about 2 pounds in the last month; we are looking for a minimum of a 5 pound weight loss per month. I will continue her on phentermine and we will see how she does at next follow-up; if she has not accomplished a min of 5lbs wt loss, then we'll need to discontinue the medication. I have discussed that she needs to follow a low-carb/low sugar diet and increase her water intake to a minimum of 64 ounces of water per day  - phentermine (ADIPEX-P) 37.5 mg tablet; Take 1 Tab by mouth every morning. Max Daily Amount: 37.5 mg.  Dispense: 30 Tab; Refill: 0    2. Weight loss counseling, encounter for  She has lost about 2 pounds in the last month; we are looking for a minimum of a 5 pound weight loss per month.   I will continue her on phentermine and we will see how she does at next follow-up; I have discussed that she needs to follow a low-carb/low sugar diet and increase her water intake to a minimum of 64 ounces of water per day  - phentermine (ADIPEX-P) 37.5 mg tablet; Take 1 Tab by mouth every morning. Max Daily Amount: 37.5 mg.  Dispense: 30 Tab; Refill: 0    3. Class 1 obesity with body mass index (BMI) of 30.0 to 30.9 in adult, unspecified obesity type, unspecified whether serious comorbidity present  Healthy diet/nutrition reviewed at length. Limit fast foods, FFF, soft drinks, late night eating, watch portion control. Smaller, more frequent meals/day. Work to increase daily physical activity as tolerated-goal 30 minutes at least 5 days a week. - phentermine (ADIPEX-P) 37.5 mg tablet; Take 1 Tab by mouth every morning. Max Daily Amount: 37.5 mg.  Dispense: 30 Tab; Refill: 0         Health Maintenance Items reviewed with patient; updates completed if applicable. >21 minutes spent with patient/reviewing records during virtual appt discussing diagnoses, treatment options, and answering any patient questions. Disclaimer:  I have discussed the diagnosis with the patient and the intended plan as seen above. The patient understands our medical plan. The risks, benefits and significant side effects of all medications have been reviewed. Anticipated time course and progression of condition reviewed. All questions have been addressed. He received an after visit summary, with information reviewed, and questions answered. Where appropriate, he/she is instructed to call the clinic if he/she has not been notified either by phone or through [address] with the results of his/her tests or with an appointment plan for any referrals within 1 week(s). The patient  is to call if his condition worsens or fails to improve or if significant side effects are experienced.      PIEDAD Roman

## 2021-04-13 NOTE — PROGRESS NOTES
Regina Hill presents today for   Chief Complaint   Patient presents with    Weight Loss       Depression Screening:  3 most recent PHQ Screens 4/13/2021   PHQ Not Done -   Little interest or pleasure in doing things Not at all   Feeling down, depressed, irritable, or hopeless Not at all   Total Score PHQ 2 0   Trouble falling or staying asleep, or sleeping too much -   Feeling tired or having little energy -   Poor appetite, weight loss, or overeating -   Feeling bad about yourself - or that you are a failure or have let yourself or your family down -   Trouble concentrating on things such as school, work, reading, or watching TV -   Moving or speaking so slowly that other people could have noticed; or the opposite being so fidgety that others notice -   Thoughts of being better off dead, or hurting yourself in some way -   PHQ 9 Score -   How difficult have these problems made it for you to do your work, take care of your home and get along with others -       Learning Assessment:  Learning Assessment 11/11/2020   PRIMARY LEARNER Patient   PRIMARY LANGUAGE ENGLISH   LEARNER PREFERENCE PRIMARY DEMONSTRATION   ANSWERED BY patient   RELATIONSHIP SELF       Health Maintenance reviewed and discussed and ordered per Provider. Health Maintenance Due   Topic Date Due    Hepatitis C Screening  Never done    PAP AKA CERVICAL CYTOLOGY  Never done    Lipid Screen  Never done   . Coordination of Care:  1. Have you been to the ER, urgent care clinic since your last visit? Hospitalized since your last visit? no    2. Have you seen or consulted any other health care providers outside of the [address] [place] [address] since your last visit? Include any pap smears or colon screening.  No

## 2021-04-19 ENCOUNTER — HOSPITAL ENCOUNTER (OUTPATIENT)
Dept: LAB | Age: 46
Discharge: HOME | End: 2021-04-19

## 2021-04-19 ENCOUNTER — VIRTUAL VISIT (OUTPATIENT)
Dept: FAMILY MEDICINE CLINIC | Age: 46
End: 2021-04-19
Payer: MEDICAID

## 2021-04-19 DIAGNOSIS — M50.30 DEGENERATION OF CERVICAL INTERVERTEBRAL DISC: ICD-10-CM

## 2021-04-19 DIAGNOSIS — N39.0 URINARY TRACT INFECTION WITH HEMATURIA, SITE UNSPECIFIED: ICD-10-CM

## 2021-04-19 DIAGNOSIS — G89.29 CHRONIC BACK PAIN, UNSPECIFIED BACK LOCATION, UNSPECIFIED BACK PAIN LATERALITY: ICD-10-CM

## 2021-04-19 DIAGNOSIS — N62 LARGE BREASTS: ICD-10-CM

## 2021-04-19 DIAGNOSIS — R30.0 DYSURIA: Primary | ICD-10-CM

## 2021-04-19 DIAGNOSIS — R30.0 DYSURIA: ICD-10-CM

## 2021-04-19 DIAGNOSIS — M54.9 CHRONIC BACK PAIN, UNSPECIFIED BACK LOCATION, UNSPECIFIED BACK PAIN LATERALITY: ICD-10-CM

## 2021-04-19 DIAGNOSIS — R31.9 URINARY TRACT INFECTION WITH HEMATURIA, SITE UNSPECIFIED: ICD-10-CM

## 2021-04-19 LAB
APPEARANCE UR: ABNORMAL
BACTERIA URNS QL MICRO: ABNORMAL /HPF
BILIRUB UR QL: ABNORMAL
COLOR UR: ABNORMAL
EPITH CASTS URNS QL MICRO: ABNORMAL /LPF (ref 0–20)
GLUCOSE UR STRIP.AUTO-MCNC: NEGATIVE MG/DL
HGB UR QL STRIP: ABNORMAL
KETONES UR QL STRIP.AUTO: NEGATIVE MG/DL
LEUKOCYTE ESTERASE UR QL STRIP.AUTO: ABNORMAL
NITRITE UR QL STRIP.AUTO: POSITIVE
PH UR STRIP: 7 [PH] (ref 5–9)
PROT UR STRIP-MCNC: 100 MG/DL
RBC #/AREA URNS HPF: ABNORMAL /HPF (ref 0–2)
SP GR UR REFRACTOMETRY: 1.01 (ref 1–1.03)
UROBILINOGEN UR QL STRIP.AUTO: 4 EU/DL (ref 0.2–1)
WBC URNS QL MICRO: ABNORMAL /HPF (ref 0–4)

## 2021-04-19 PROCEDURE — 99001 SPECIMEN HANDLING PT-LAB: CPT

## 2021-04-19 PROCEDURE — 99443 PR PHYS/QHP TELEPHONE EVALUATION 21-30 MIN: CPT | Performed by: NURSE PRACTITIONER

## 2021-04-19 PROCEDURE — 81001 URINALYSIS AUTO W/SCOPE: CPT

## 2021-04-19 RX ORDER — NITROFURANTOIN 25; 75 MG/1; MG/1
100 CAPSULE ORAL 2 TIMES DAILY
Qty: 10 CAP | Refills: 0 | Status: CANCELLED | OUTPATIENT
Start: 2021-04-19 | End: 2021-04-24

## 2021-04-19 RX ORDER — CIPROFLOXACIN 250 MG/1
250 TABLET, FILM COATED ORAL EVERY 12 HOURS
Qty: 6 TAB | Refills: 0 | Status: SHIPPED | OUTPATIENT
Start: 2021-04-19 | End: 2021-04-22

## 2021-04-19 NOTE — PROGRESS NOTES
Caitlin Rocky presents today for   Chief Complaint   Patient presents with    Referral Request     Breast Lift Consult       Depression Screening:  3 most recent PHQ Screens 4/19/2021   PHQ Not Done -   Little interest or pleasure in doing things Not at all   Feeling down, depressed, irritable, or hopeless Not at all   Total Score PHQ 2 0   Trouble falling or staying asleep, or sleeping too much -   Feeling tired or having little energy -   Poor appetite, weight loss, or overeating -   Feeling bad about yourself - or that you are a failure or have let yourself or your family down -   Trouble concentrating on things such as school, work, reading, or watching TV -   Moving or speaking so slowly that other people could have noticed; or the opposite being so fidgety that others notice -   Thoughts of being better off dead, or hurting yourself in some way -   PHQ 9 Score -   How difficult have these problems made it for you to do your work, take care of your home and get along with others -       Learning Assessment:  Learning Assessment 11/11/2020   PRIMARY LEARNER Patient   PRIMARY LANGUAGE ENGLISH   LEARNER PREFERENCE PRIMARY DEMONSTRATION   ANSWERED BY patient   RELATIONSHIP SELF       Health Maintenance reviewed and discussed and ordered per Provider. Health Maintenance Due   Topic Date Due    Hepatitis C Screening  Never done    COVID-19 Vaccine (1) Never done    PAP AKA CERVICAL CYTOLOGY  Never done    Lipid Screen  Never done   . Coordination of Care:  1. Have you been to the ER, urgent care clinic since your last visit? Hospitalized since your last visit? no    2. Have you seen or consulted any other health care providers outside of the [address] [place] [address] since your last visit? Include any pap smears or colon screening.  No    Neck and back issues    Breast skin discomfort-yrs    Pap-2 yrs-due

## 2021-04-19 NOTE — PROGRESS NOTES
I am seeing this patient today virtually using HIPAA-compliant video-conferencing technology due to the COVID-19 Pandemic. The patient has previously provided full consent to use this technology and understands the risks and benefits of proceeding. I am seeing the patient today from my office in [place], [place]. The patient is in his/her home located within [place]. Patient already made aware that this is a virtual visit/telehealth with provider and that for the purposes of billing, we will submit a claim for reimbursement with your insurance company. He/She was informed that he/she will be responsible for any copays, coinsurance amounts or other amounts not covered by his/her insurance company. Patient consented and accepted terms of virtual visit. THIS VISIT WAS CONDUCTED OVER THE TELEPHONE    HPI  Jorge A Rodriguez is a 54 y.o. female and presents today for Referral Request (Breast Lift Consult)  [facility]2-3w[facility] ago); went in for consult for breast lift; says surgeon was recommending breast reduction; Currently in about a DDD bra; 18+ years of back and neck issues per report; in reviewing my records, since patient has been under my care, we have had discussions about her neck pain; was previously using Flexeril, Topamax and NSAID as needed. Patient reports that prior to seeing me, the previous PCP did x-rays of her back and told her she essentially had arthritis. I do not have access to these records at this time. The patient also reports that yesterday she developed painful urination. She is concerned about a UTI. Also has urinary urgency/frequency. No blood noted. Allergies  Allergies   Allergen Reactions    Sulfa (Sulfonamide Antibiotics) Other (comments)     Abdominal pain        Medications  Current Outpatient Medications   Medication Sig Dispense    ciprofloxacin HCl (CIPRO) 250 mg tablet Take 1 Tab by mouth every twelve (12) hours for 3 days.  6 Tab    phentermine (ADIPEX-P) 37.5 mg tablet Take 1 Tab by mouth every morning. Max Daily Amount: 37.5 mg. 30 Tab    busPIRone (BUSPAR) 7.5 mg tablet Take 1 Tab by mouth two (2) times a day. 60 Tab    omeprazole (PRILOSEC) 40 mg capsule TAKE ONE CAPSULE BY MOUTH DAILY 30 Cap    FLUoxetine (PROzac) 20 mg capsule Take 1 Cap by mouth nightly. 30 Cap    ondansetron (ZOFRAN ODT) 8 mg disintegrating tablet PLACE 1 TABLET ON TOP OF TONGUE WHERE IT WILL DISSOLVE, THEN SWALLOW THREE TIMES DAILY FOR 10 DAYS 30 Tab     No current facility-administered medications for this visit. Screening  On the basis of positive PHQ-9 screening ( ), C-SSRS completed. Patient will follow-up as needed/as directed with PCP.     Health Maintenance  Health Maintenance Due   Topic Date Due    Hepatitis C Screening  Never done    COVID-19 Vaccine (1) Never done    PAP AKA CERVICAL CYTOLOGY  Never done    Lipid Screen  Never done        Problem List  Patient Active Problem List    Diagnosis Date Noted    Chronic back pain 2021    Large breasts 2021    Weight loss 2021    Weight loss counseling, encounter for 2021    Class 1 obesity with body mass index (BMI) of 30.0 to 30.9 in adult 2021    Degeneration of cervical intervertebral disc 12/15/2020    GERD without esophagitis 2020    CRISTOFER (generalized anxiety disorder) 2020    Major depressive disorder with current active episode 2020        Family Hx  Family History   Problem Relation Age of Onset    No Known Problems Mother     Alcohol abuse Father         Social Hx  Social History     Socioeconomic History    Marital status:      Spouse name: Not on file    Number of children: Not on file    Years of education: Not on file    Highest education level: Not on file   Tobacco Use    Smoking status: Former Smoker     Packs/day: 0.25     Quit date:      Years since quittin.3    Smokeless tobacco: Never Used   Substance and Sexual Activity    Alcohol use: Never     Frequency: Never     Comment: social    Drug use: Never        Surgical Hx  Past Surgical History:   Procedure Laterality Date    HX CHOLECYSTECTOMY      HX HYSTERECTOMY  09/01/2019        Vitals  There were no vitals taken for this visit. Patient-Reported Vitals 4/19/2021   Patient-Reported Weight 156   Patient-Reported Pulse -   Patient-Reported SpO2 -   Patient-Reported Systolic  -   Patient-Reported Diastolic -        ROS  Review of Systems   Constitutional: Negative for chills, fever and malaise/fatigue. HENT: Negative for congestion, ear pain, sinus pain and sore throat. Eyes: Negative for blurred vision and redness. Respiratory: Negative for cough, sputum production, shortness of breath and wheezing. Cardiovascular: Negative for chest pain, palpitations and leg swelling. Gastrointestinal: Negative for abdominal pain, constipation, diarrhea, heartburn, nausea and vomiting. Genitourinary: Positive for dysuria, frequency and urgency. Negative for flank pain and hematuria. Musculoskeletal: Positive for back pain and neck pain. Negative for falls, joint pain and myalgias. Skin: Negative for rash. Neurological: Negative for dizziness, seizures, weakness and headaches. Endo/Heme/Allergies: Does not bruise/bleed easily. Psychiatric/Behavioral: Negative for depression and suicidal ideas. The patient does not have insomnia. Physical Exam  Patient is a 54y.o. year old female     Physical exam was deferred due to [facility]- 19 precautions as visit was completed via telemedicine. Assessment/Plan  1. Dysuria  We will obtain urine studies  - URINALYSIS W/MICROSCOPIC; Future  - CULTURE, URINE; Future    2. Degeneration of cervical intervertebral disc  Chronic issue; previously she has used Flexeril, Topamax and NSAIDs    3.  Chronic back pain, unspecified back location, unspecified back pain laterality  Per report; I am unable to access her old records prior to her establishing with me; patient states that her former PCP obtain x-rays of her back which essentially showed arthritis    4. Large breasts  Patient states she is size triple D; she has been seeing a surgeon recently for a consult and he recommends a breast reduction    5. Urinary tract infection with hematuria, site unspecified  I reviewed UA; it is indicative of acute UTI; I will send in antibiotics; she can let us know if her symptoms do not resolve; we will see what her culture shows  - ciprofloxacin HCl (CIPRO) 250 mg tablet; Take 1 Tab by mouth every twelve (12) hours for 3 days. Dispense: 6 Tab; Refill: 0         Health Maintenance Items reviewed with patient; updates completed if applicable. >21 minutes spent with patient/reviewing records during virtual appt discussing diagnoses, treatment options, and answering any patient questions. Disclaimer:  I have discussed the diagnosis with the patient and the intended plan as seen above. The patient understands our medical plan. The risks, benefits and significant side effects of all medications have been reviewed. Anticipated time course and progression of condition reviewed. All questions have been addressed. He received an after visit summary, with information reviewed, and questions answered. Where appropriate, he/she is instructed to call the clinic if he/she has not been notified either by phone or through [address] with the results of his/her tests or with an appointment plan for any referrals within 1 week(s). The patient  is to call if his condition worsens or fails to improve or if significant side effects are experienced.      Cl Etienne, FNP-BC

## 2021-04-22 LAB — BACTERIA UR CULT: ABNORMAL

## 2021-04-23 NOTE — PROGRESS NOTES
Urine culture positive; I have treated her with an appropriate abx; please find out if she's feeling better as we may need to extend the course of abx.

## 2021-04-27 RX ORDER — CIPROFLOXACIN 500 MG/1
500 TABLET ORAL 2 TIMES DAILY
Qty: 10 TAB | Refills: 0 | Status: SHIPPED | OUTPATIENT
Start: 2021-04-27 | End: 2021-05-02

## 2021-05-04 ENCOUNTER — TELEPHONE (OUTPATIENT)
Dept: FAMILY MEDICINE CLINIC | Age: 46
End: 2021-05-04

## 2021-05-04 NOTE — PROGRESS NOTES
HPI    Maurice Randall is a 54 y.o. female and presents today for Weight Loss (clinic)  #4 appt/refill  Patient presents for weight loss follow-up. She has lost 1 pound since her last follow-up a month ago. Prior to that she had lost 2 pounds. In the past 2 months she has lost 3 pounds. As previously discussed with patient, would like to see a minimum of a 5 pound weight loss in a month, with the use of phentermine. We discussed diet today. She has cut down on a lot of things to include bread, other carbs. There is still some sugar in her diet. Denies any problems with constipation. She says she drinks plenty of water daily. She reports that she is feeling better with the bit of weight loss that she has achieved. Allergies    Allergies   Allergen Reactions    Sulfa (Sulfonamide Antibiotics) Other (comments)     Abdominal pain        Medications    Current Outpatient Medications   Medication Sig Dispense    phentermine (ADIPEX-P) 37.5 mg tablet Take 1 Tab by mouth every morning. Max Daily Amount: 37.5 mg. 30 Tab    busPIRone (BUSPAR) 7.5 mg tablet Take 1 Tab by mouth two (2) times a day. 60 Tab    FLUoxetine (PROzac) 20 mg capsule Take 1 Cap by mouth nightly. 30 Cap    omeprazole (PRILOSEC) 40 mg capsule TAKE ONE CAPSULE BY MOUTH DAILY 30 Cap    ondansetron (ZOFRAN ODT) 8 mg disintegrating tablet PLACE 1 TABLET ON TOP OF TONGUE WHERE IT WILL DISSOLVE, THEN SWALLOW THREE TIMES DAILY FOR 10 DAYS 30 Tab     No current facility-administered medications for this visit. Screening  On the basis of positive PHQ-9 screening ( ), C-SSRS completed.   Patient will follow-up as needed    Health Maintenance    Health Maintenance Due   Topic Date Due    Hepatitis C Screening  Never done    COVID-19 Vaccine (1) Never done    PAP AKA CERVICAL CYTOLOGY  Never done    Lipid Screen  Never done        Problem List    Patient Active Problem List    Diagnosis Date Noted    Overweight (BMI 25.0-29.9) 05/06/2021    Chronic back pain 2021    Large breasts 2021    Weight loss 2021    Weight loss counseling, encounter for 2021    Class 1 obesity with body mass index (BMI) of 30.0 to 30.9 in adult 2021    Degeneration of cervical intervertebral disc 12/15/2020    GERD without esophagitis 2020    CRISTOFER (generalized anxiety disorder) 2020    Major depressive disorder with current active episode 2020        Family Hx    Family History   Problem Relation Age of Onset    No Known Problems Mother     Alcohol abuse Father         Social Hx    Social History     Socioeconomic History    Marital status:      Spouse name: Not on file    Number of children: Not on file    Years of education: Not on file    Highest education level: Not on file   Tobacco Use    Smoking status: Former Smoker     Packs/day: 0.25     Quit date:      Years since quittin.3    Smokeless tobacco: Never Used   Substance and Sexual Activity    Alcohol use: Never     Frequency: Never     Comment: social    Drug use: Never    Sexual activity: Yes        Surgical Hx    Past Surgical History:   Procedure Laterality Date    HX CHOLECYSTECTOMY      HX HYSTERECTOMY  2019        Gyn Hx    As reported in history    Vitals    Visit Vitals  /86   Pulse 93   Temp 97.8 °F (36.6 °C)   Ht 5' 5\" (1.651 m)   Wt 167 lb (75.8 kg)   SpO2 94%   BMI 27.79 kg/m²        ROS    Review of Systems   Constitutional: Negative for chills, fever and malaise/fatigue. HENT: Negative for congestion, ear pain, sinus pain and sore throat. Eyes: Negative for blurred vision and redness. Respiratory: Negative for cough, sputum production, shortness of breath and wheezing. Cardiovascular: Negative for chest pain, palpitations and leg swelling. Gastrointestinal: Negative for abdominal pain, constipation, diarrhea, heartburn, nausea and vomiting. Genitourinary: Negative for dysuria and hematuria. Musculoskeletal: Negative for falls, joint pain and myalgias. Skin: Negative for rash. Neurological: Negative for dizziness, seizures, weakness and headaches. Endo/Heme/Allergies: Does not bruise/bleed easily. Psychiatric/Behavioral: Negative for depression and suicidal ideas. The patient does not have insomnia. Physical Exam    Physical Exam  Vitals signs and nursing note reviewed. Constitutional:       General: She is not in acute distress. Appearance: Normal appearance. She is not ill-appearing or toxic-appearing. HENT:      Head: Normocephalic and atraumatic. Right Ear: Tympanic membrane, ear canal and external ear normal. There is no impacted cerumen. Left Ear: Tympanic membrane, ear canal and external ear normal. There is no impacted cerumen. Nose: Nose normal. No congestion or rhinorrhea. Mouth/Throat:      Mouth: Mucous membranes are moist.      Pharynx: No oropharyngeal exudate or posterior oropharyngeal erythema. Eyes:      General:         Right eye: No discharge. Left eye: No discharge. Extraocular Movements: Extraocular movements intact. Conjunctiva/sclera: Conjunctivae normal.      Pupils: Pupils are equal, round, and reactive to light. Neck:      Musculoskeletal: Normal range of motion and neck supple. No muscular tenderness. Vascular: No carotid bruit. Cardiovascular:      Rate and Rhythm: Normal rate and regular rhythm. Pulses: Normal pulses. Heart sounds: Normal heart sounds. Pulmonary:      Effort: Pulmonary effort is normal. No respiratory distress. Breath sounds: Normal breath sounds. No wheezing, rhonchi or rales. Chest:      Chest wall: No tenderness. Abdominal:      General: Abdomen is flat. Bowel sounds are normal. There is no distension. Palpations: Abdomen is soft. There is no mass. Tenderness: There is no abdominal tenderness. There is no guarding or rebound.       Hernia: No hernia is present. Musculoskeletal: Normal range of motion. Lymphadenopathy:      Cervical: No cervical adenopathy. Skin:     General: Skin is warm and dry. Capillary Refill: Capillary refill takes 2 to 3 seconds. Neurological:      General: No focal deficit present. Mental Status: She is alert and oriented to person, place, and time. Cranial Nerves: No cranial nerve deficit. Motor: No weakness. Gait: Gait normal.      Deep Tendon Reflexes: Reflexes normal.   Psychiatric:         Mood and Affect: Mood normal.         Behavior: Behavior normal.         Thought Content: Thought content normal.         Judgment: Judgment normal.          Assessment/Plan    1. Weight loss counseling, encounter for  At this time, given the very minimal weight loss over the past 1 to 2 months, I do not think she is a candidate for phentermine. We discussed diet in detail and I offered a referral to a dietitian and patient declined at this time. She knows she should cut down on some of the sugars in her diet. We also discussed increasing physical activity now that the weather is nicer. I want her to continue to work on diet and exercise at home, I think she can do it and continue to lose weight. 2. Overweight (BMI 25.0-29. 9)  BMI 27.7  Healthy diet/nutrition reviewed at length. Limit fast foods, FFF, soft drinks, late night eating, watch portion control. Smaller, more frequent meals/day. Work to increase daily physical activity as tolerated-goal 30 minutes at least 5 days a week. Health Maintenance Items reviewed with patient as noted. No orders of the defined types were placed in this encounter.        ROSALIA Mckinney, FNP-BC  tcg

## 2021-05-06 ENCOUNTER — OFFICE VISIT (OUTPATIENT)
Dept: FAMILY MEDICINE CLINIC | Age: 46
End: 2021-05-06
Payer: MEDICAID

## 2021-05-06 VITALS
DIASTOLIC BLOOD PRESSURE: 86 MMHG | WEIGHT: 167 LBS | SYSTOLIC BLOOD PRESSURE: 122 MMHG | HEART RATE: 93 BPM | HEIGHT: 65 IN | TEMPERATURE: 97.8 F | BODY MASS INDEX: 27.82 KG/M2 | OXYGEN SATURATION: 94 %

## 2021-05-06 DIAGNOSIS — Z71.3 WEIGHT LOSS COUNSELING, ENCOUNTER FOR: Primary | ICD-10-CM

## 2021-05-06 DIAGNOSIS — E66.3 OVERWEIGHT (BMI 25.0-29.9): ICD-10-CM

## 2021-05-06 PROCEDURE — 99213 OFFICE O/P EST LOW 20 MIN: CPT | Performed by: NURSE PRACTITIONER

## 2021-05-06 NOTE — PROGRESS NOTES
Svetlana Rae (: 1975) is a 54 y.o. female, {established vs new:47854::\"established\"} patient, here for evaluation of the following chief complaint(s):  
Weight Loss (clinic) ASSESSMENT/PLAN: 
Below is the assessment and plan developed based on review of pertinent labs, studies, and medications. 1. Weight loss counseling, encounter for 2. Class 1 obesity with body mass index (BMI) of 30.0 to 30.9 in adult, unspecified obesity type, unspecified whether serious comorbidity present No follow-ups on file. SUBJECTIVE/OBJECTIVE: 
HPI Review of Systems Patient-Reported Vitals 2021 Patient-Reported Weight 156 Patient-Reported Pulse - Patient-Reported SpO2 -  
Patient-Reported Systolic  - Patient-Reported Diastolic - Physical Exam 
 
{Virtual visit PE with detailed exam Optional:78020} {Time Documentation Optional:49160} Svetlana Rae, was evaluated through a synchronous (real-time) audio-video encounter. The patient (or guardian if applicable) is aware that this is a billable service. Verbal consent to proceed has been obtained within the past 12 months. The visit was conducted pursuant to the emergency declaration under the [facility], [address] [place] [address] authority and the [employer] and [employer] Act. Patient identification was verified, and a caregiver was present when appropriate. The patient was located in a state where the provider was credentialed to provide care. An electronic signature was used to authenticate this note.  
-- Michelle Aleman

## 2021-05-06 NOTE — PROGRESS NOTES
Luciana Georgesrosa presents today for   Chief Complaint   Patient presents with    Weight Loss     clinic       Depression Screening:  3 most recent PHQ Screens 5/6/2021   PHQ Not Done -   Little interest or pleasure in doing things Not at all   Feeling down, depressed, irritable, or hopeless Not at all   Total Score PHQ 2 0   Trouble falling or staying asleep, or sleeping too much -   Feeling tired or having little energy -   Poor appetite, weight loss, or overeating -   Feeling bad about yourself - or that you are a failure or have let yourself or your family down -   Trouble concentrating on things such as school, work, reading, or watching TV -   Moving or speaking so slowly that other people could have noticed; or the opposite being so fidgety that others notice -   Thoughts of being better off dead, or hurting yourself in some way -   PHQ 9 Score -   How difficult have these problems made it for you to do your work, take care of your home and get along with others -       Learning Assessment:  Learning Assessment 11/11/2020   PRIMARY LEARNER Patient   PRIMARY LANGUAGE ENGLISH   LEARNER PREFERENCE PRIMARY DEMONSTRATION   ANSWERED BY patient   RELATIONSHIP SELF       Health Maintenance reviewed and discussed and ordered per Provider. Health Maintenance Due   Topic Date Due    Hepatitis C Screening  Never done    COVID-19 Vaccine (1) Never done    PAP AKA CERVICAL CYTOLOGY  Never done    Lipid Screen  Never done   . Coordination of Care:  1. Have you been to the ER, urgent care clinic since your last visit? Hospitalized since your last visit? no    2. Have you seen or consulted any other health care providers outside of the [address] [place] [address] since your last visit? Include any pap smears or colon screening.  no

## 2021-06-02 ENCOUNTER — TRANSCRIBE ORDER (OUTPATIENT)
Dept: SCHEDULING | Age: 46
End: 2021-06-02

## 2021-06-02 DIAGNOSIS — Z12.31 VISIT FOR SCREENING MAMMOGRAM: Primary | ICD-10-CM

## 2021-06-04 ENCOUNTER — VIRTUAL VISIT (OUTPATIENT)
Dept: FAMILY MEDICINE CLINIC | Age: 46
End: 2021-06-04
Payer: MEDICAID
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
## 2021-06-04 DIAGNOSIS — S30.860A INSECT BITE OF LOWER BACK, INITIAL ENCOUNTER: Primary | ICD-10-CM

## 2021-06-04 PROCEDURE — 99442 PR PHYS/QHP TELEPHONE EVALUATION 11-20 MIN: CPT | Performed by: NURSE PRACTITIONER

## 2021-06-04 RX ORDER — CEPHALEXIN 500 MG/1
500 CAPSULE ORAL 4 TIMES DAILY
Qty: 40 CAPSULE | Refills: 0 | Status: SHIPPED | OUTPATIENT
Start: 2021-06-04 | End: 2021-06-14

## 2021-06-04 RX ORDER — NAPROXEN 500 MG/1
500 TABLET ORAL 2 TIMES DAILY WITH MEALS
Qty: 60 TABLET | Refills: 0 | Status: SHIPPED | OUTPATIENT
Start: 2021-06-04 | End: 2022-02-14

## 2021-06-04 NOTE — PROGRESS NOTES
I am seeing this patient today virtually using HIPAA-compliant video-conferencing technology due to the COVID-19 Pandemic. The patient has previously provided full consent to use this technology and understands the risks and benefits of proceeding. I am seeing the patient today from my office in [place], [place]. The patient is in his/her home located within [place]. Patient already made aware that this is a virtual visit/telehealth with provider and that for the purposes of billing, we will submit a claim for reimbursement with your insurance company. He/She was informed that he/she will be responsible for any copays, coinsurance amounts or other amounts not covered by his/her insurance company. Patient consented and accepted terms of virtual visit. THIS VISIT WAS CONDUCTED OVER THE TELEPHONE    HPI  Ronn Gao is a 54 y.o. female and presents today for Insect Bite (6 days, painful, left of buttocks, big nodule starting to scab-believes to be a Spider bite)  Bite above her left buttocks; Felt initial burning then it started to itch; Area is swollen; round nodule, central scabbing; She believes she was sleeping in house when this occurred; She has googled pictures and her friends think it may have been a brown recluse bite. She says the pain has not let up at all and is worse. The scabbed area is a little smaller than the size of a dime; the nodule is same size; redness extends to about the size of a golf ball around pustule area; no drainage from site; she has not been taking anything for the pain. She says it seems to be staying about the same. She reports uptodate Td. Allergies  Allergies   Allergen Reactions    Sulfa (Sulfonamide Antibiotics) Other (comments)     Abdominal pain        Medications  Current Outpatient Medications   Medication Sig Dispense    naproxen (NAPROSYN) 500 mg tablet Take 1 Tablet by mouth two (2) times daily (with meals).  60 Tablet    cephALEXin (KEFLEX) 500 mg capsule Take 1 Capsule by mouth four (4) times daily for 10 days. 40 Capsule    busPIRone (BUSPAR) 7.5 mg tablet Take 1 Tab by mouth two (2) times a day. 60 Tab    omeprazole (PRILOSEC) 40 mg capsule TAKE ONE CAPSULE BY MOUTH DAILY 30 Cap    ondansetron (ZOFRAN ODT) 8 mg disintegrating tablet PLACE 1 TABLET ON TOP OF TONGUE WHERE IT WILL DISSOLVE, THEN SWALLOW THREE TIMES DAILY FOR 10 DAYS 30 Tab    phentermine (ADIPEX-P) 37.5 mg tablet Take 1 Tab by mouth every morning. Max Daily Amount: 37.5 mg. (Patient not taking: Reported on 6/4/2021) 30 Tab    FLUoxetine (PROzac) 20 mg capsule Take 1 Cap by mouth nightly. (Patient not taking: Reported on 6/4/2021) 30 Cap     No current facility-administered medications for this visit. Screening  On the basis of positive PHQ-9 screening ( ), C-SSRS completed. Patient will follow-up as needed/as directed with PCP.     Health Maintenance  Health Maintenance Due   Topic Date Due    Hepatitis C Screening  Never done    COVID-19 Vaccine (1) Never done    PAP AKA CERVICAL CYTOLOGY  Never done    Lipid Screen  Never done        Problem List  Patient Active Problem List    Diagnosis Date Noted    Overweight (BMI 25.0-29.9) 05/06/2021    Chronic back pain 04/19/2021    Large breasts 04/19/2021    Weight loss 02/09/2021    Weight loss counseling, encounter for 02/09/2021    Class 1 obesity with body mass index (BMI) of 30.0 to 30.9 in adult 02/09/2021    Degeneration of cervical intervertebral disc 12/15/2020    GERD without esophagitis 12/07/2020    CRISTOFER (generalized anxiety disorder) 11/11/2020    Major depressive disorder with current active episode 11/11/2020        Family Hx  Family History   Problem Relation Age of Onset    No Known Problems Mother     Alcohol abuse Father         Social Hx  Social History     Socioeconomic History    Marital status:      Spouse name: Not on file    Number of children: Not on file    Years of education: Not on file   [facility] Highest education level: Not on file   Tobacco Use    Smoking status: Former Smoker     Packs/day: 0.25     Quit date: 0     Years since quittin.4    Smokeless tobacco: Never Used   Vaping Use    Vaping Use: Never used   Substance and Sexual Activity    Alcohol use: Never     Comment: social    Drug use: Never    Sexual activity: Yes     Social Determinants of Health     Financial Resource Strain:     Difficulty of Paying Living Expenses:    Food Insecurity:     Worried About Running Out of Food in the Last Year:     Ran Out of Food in the Last Year:    Transportation Needs:     Lack of Transportation (Medical):  Lack of Transportation (Non-Medical):    Physical Activity:     Days of Exercise per Week:     Minutes of Exercise per Session:    Stress:     Feeling of Stress :    Social Connections:     Frequency of Communication with Friends and Family:     Frequency of Social Gatherings with Friends and Family:     Attends Anabaptist Services:     Active Member of Clubs or Organizations:     Attends Club or Organization Meetings:     Marital Status:         Surgical Hx  Past Surgical History:   Procedure Laterality Date    HX CHOLECYSTECTOMY      HX HYSTERECTOMY  2019        Vitals  There were no vitals taken for this visit. Patient-Reported Vitals 2021   Patient-Reported Weight 167   Patient-Reported Pulse -   Patient-Reported SpO2 -   Patient-Reported Systolic  -   Patient-Reported Diastolic -        ROS  Review of Systems   Constitutional: Negative for chills, fever and malaise/fatigue. HENT: Negative for congestion, ear pain, sinus pain and sore throat. Eyes: Negative for blurred vision and redness. Respiratory: Negative for cough, sputum production, shortness of breath and wheezing. Cardiovascular: Negative for chest pain, palpitations and leg swelling. Gastrointestinal: Negative for abdominal pain, constipation, diarrhea, heartburn, nausea and vomiting. Genitourinary: Negative for dysuria and hematuria. Musculoskeletal: Negative for falls, joint pain and myalgias. Skin: Positive for rash. Neurological: Negative for dizziness, seizures, weakness and headaches. Endo/Heme/Allergies: Does not bruise/bleed easily. Psychiatric/Behavioral: Negative for depression and suicidal ideas. The patient does not have insomnia. Physical Exam  Patient is a 54y.o. year old female     Physical exam was deferred due to [employer]- 19 precautions as visit was completed via telemedicine. Assessment/Plan  1. Insect bite of lower back, initial encounter  We do not know for sure whether this was a spider bite versus other insect; and that it is day 6, I think we can go ahead and give her a course of antibiotics as well as NSAIDs to help with the pain and inflammation. I discussed course of insect bites with patient. For any worsening symptoms or spreading/enlarging of the scabbed area to resemble worsening possible necrosis, I have advised patient to go to the ER. All questions answered. She will let me know if she needs anything  - naproxen (NAPROSYN) 500 mg tablet; Take 1 Tablet by mouth two (2) times daily (with meals). Dispense: 60 Tablet; Refill: 0  - cephALEXin (KEFLEX) 500 mg capsule; Take 1 Capsule by mouth four (4) times daily for 10 days. Dispense: 40 Capsule; Refill: 0         Health Maintenance Items reviewed with patient; updates completed if applicable. >21 minutes spent with patient/reviewing records during virtual appt discussing diagnoses, treatment options, and answering any patient questions. Disclaimer:  I have discussed the diagnosis with the patient and the intended plan as seen above. The patient understands our medical plan. The risks, benefits and significant side effects of all medications have been reviewed. Anticipated time course and progression of condition reviewed. All questions have been addressed.   He received an after visit summary, with information reviewed, and questions answered. Where appropriate, he/she is instructed to call the clinic if he/she has not been notified either by phone or through [address] with the results of his/her tests or with an appointment plan for any referrals within 1 week(s). The patient  is to call if his condition worsens or fails to improve or if significant side effects are experienced.      Martha Cooper, MARVINP-BC tcg

## 2021-06-04 NOTE — PROGRESS NOTES
Edel Flor presents today for   Chief Complaint   Patient presents with    Insect Bite     6 days, painful, left of buttocks, big nodule starting to scab-believes to be a Spider bite       Depression Screening:  3 most recent PHQ Screens 6/4/2021   PHQ Not Done -   Little interest or pleasure in doing things Not at all   Feeling down, depressed, irritable, or hopeless Not at all   Total Score PHQ 2 0   Trouble falling or staying asleep, or sleeping too much -   Feeling tired or having little energy -   Poor appetite, weight loss, or overeating -   Feeling bad about yourself - or that you are a failure or have let yourself or your family down -   Trouble concentrating on things such as school, work, reading, or watching TV -   Moving or speaking so slowly that other people could have noticed; or the opposite being so fidgety that others notice -   Thoughts of being better off dead, or hurting yourself in some way -   PHQ 9 Score -   How difficult have these problems made it for you to do your work, take care of your home and get along with others -       Learning Assessment:  Learning Assessment 11/11/2020   PRIMARY LEARNER Patient   PRIMARY LANGUAGE ENGLISH   LEARNER PREFERENCE PRIMARY DEMONSTRATION   ANSWERED BY patient   RELATIONSHIP SELF       Health Maintenance reviewed and discussed and ordered per Provider. Health Maintenance Due   Topic Date Due    Hepatitis C Screening  Never done    COVID-19 Vaccine (1) Never done    PAP AKA CERVICAL CYTOLOGY  Never done    Lipid Screen  Never done   . Coordination of Care:  1. Have you been to the ER, urgent care clinic since your last visit? Hospitalized since your last visit? no    2. Have you seen or consulted any other health care providers outside of the [address] [place] [address] since your last visit? Include any pap smears or colon screening.  No

## 2021-06-07 DIAGNOSIS — F41.1 GAD (GENERALIZED ANXIETY DISORDER): ICD-10-CM

## 2021-06-07 RX ORDER — BUSPIRONE HYDROCHLORIDE 7.5 MG/1
TABLET ORAL
Qty: 60 TABLET | Refills: 5 | Status: SHIPPED | OUTPATIENT
Start: 2021-06-07 | End: 2022-02-14

## 2021-06-14 ENCOUNTER — HOSPITAL ENCOUNTER (OUTPATIENT)
Dept: MAMMOGRAPHY | Age: 46
Discharge: HOME | End: 2021-06-14
Attending: OBSTETRICS & GYNECOLOGY
Payer: MEDICAID

## 2021-06-14 DIAGNOSIS — Z12.31 VISIT FOR SCREENING MAMMOGRAM: ICD-10-CM

## 2021-06-14 PROCEDURE — 77067 SCR MAMMO BI INCL CAD: CPT

## 2021-06-25 DIAGNOSIS — K21.9 GERD WITHOUT ESOPHAGITIS: ICD-10-CM

## 2021-06-25 RX ORDER — OMEPRAZOLE 40 MG/1
CAPSULE, DELAYED RELEASE ORAL
Qty: 30 CAPSULE | Refills: 5 | Status: SHIPPED | OUTPATIENT
Start: 2021-06-25 | End: 2022-10-13

## 2021-09-03 ENCOUNTER — HOSPITAL ENCOUNTER (OUTPATIENT)
Dept: LAB | Age: 46
Discharge: HOME | End: 2021-09-03

## 2021-09-03 ENCOUNTER — VIRTUAL VISIT (OUTPATIENT)
Dept: FAMILY MEDICINE CLINIC | Age: 46
End: 2021-09-03
Payer: MEDICAID

## 2021-09-03 DIAGNOSIS — N39.0 URINARY TRACT INFECTION WITHOUT HEMATURIA, SITE UNSPECIFIED: Primary | ICD-10-CM

## 2021-09-03 DIAGNOSIS — N39.0 URINARY TRACT INFECTION WITHOUT HEMATURIA, SITE UNSPECIFIED: ICD-10-CM

## 2021-09-03 PROCEDURE — 99001 SPECIMEN HANDLING PT-LAB: CPT

## 2021-09-03 PROCEDURE — 99443 PR PHYS/QHP TELEPHONE EVALUATION 21-30 MIN: CPT | Performed by: NURSE PRACTITIONER

## 2021-09-03 RX ORDER — NITROFURANTOIN 25; 75 MG/1; MG/1
100 CAPSULE ORAL 2 TIMES DAILY
Qty: 10 CAPSULE | Refills: 0 | Status: SHIPPED | OUTPATIENT
Start: 2021-09-03 | End: 2021-09-08

## 2021-09-03 NOTE — PROGRESS NOTES
I am seeing this patient today virtually using HIPAA-compliant video-conferencing technology due to the COVID-19 Pandemic. The patient has previously provided full consent to use this technology and understands the risks and benefits of proceeding. I am seeing the patient today from my office in [place], [place]. The patient is in his/her home located within [place]. Patient already made aware that this is a virtual visit/telehealth with provider and that for the purposes of billing, we will submit a claim for reimbursement with your insurance company. He/She was informed that he/she will be responsible for any copays, coinsurance amounts or other amounts not covered by his/her insurance company. Patient consented and accepted terms of virtual visit. THIS VISIT WAS CONDUCTED OVER THE TELEPHONE    HPI  Bryan Shultz is a 51 y.o. female and presents today for UTI  For the past 1-2 days she's had urinary frequency; she is having a burning sensation when voiding. No other symptoms or complaints. Allergies  Allergies   Allergen Reactions    Sulfa (Sulfonamide Antibiotics) Other (comments)     Abdominal pain        Medications  Current Outpatient Medications   Medication Sig Dispense    nitrofurantoin, macrocrystal-monohydrate, (Macrobid) 100 mg capsule Take 1 Capsule by mouth two (2) times a day for 5 days. 10 Capsule    omeprazole (PRILOSEC) 40 mg capsule TAKE 1 CAPSULE BY MOUTH DAILY 30 Capsule    busPIRone (BUSPAR) 7.5 mg tablet TAKE 1 TABLET BY MOUTH TWICE DAILY 60 Tablet    naproxen (NAPROSYN) 500 mg tablet Take 1 Tablet by mouth two (2) times daily (with meals). 60 Tablet    phentermine (ADIPEX-P) 37.5 mg tablet Take 1 Tab by mouth every morning. Max Daily Amount: 37.5 mg. (Patient not taking: Reported on 6/4/2021) 30 Tab    FLUoxetine (PROzac) 20 mg capsule Take 1 Cap by mouth nightly.  (Patient not taking: Reported on 6/4/2021) 30 Cap    ondansetron (ZOFRAN ODT) 8 mg disintegrating tablet PLACE 1 TABLET ON TOP OF TONGUE WHERE IT WILL DISSOLVE, THEN SWALLOW THREE TIMES DAILY FOR 10 DAYS 30 Tab     No current facility-administered medications for this visit. Screening  On the basis of positive PHQ-9 screening ( ), C-SSRS completed. Patient will follow-up as needed/as directed with PCP.     Health Maintenance  Health Maintenance Due   Topic Date Due    Hepatitis C Screening  Never done    COVID-19 Vaccine (1) Never done    PAP AKA CERVICAL CYTOLOGY  Never done    Lipid Screen  Never done    Colorectal Cancer Screening Combo  Never done    Flu Vaccine (1) Never done        Problem List  Patient Active Problem List    Diagnosis Date Noted    Overweight (BMI 25.0-29.9) 2021    Chronic back pain 2021    Large breasts 2021    Weight loss 2021    Weight loss counseling, encounter for 2021    Class 1 obesity with body mass index (BMI) of 30.0 to 30.9 in adult 2021    Degeneration of cervical intervertebral disc 12/15/2020    GERD without esophagitis 2020    CRISTOFER (generalized anxiety disorder) 2020    Major depressive disorder with current active episode 2020        Family Hx  Family History   Problem Relation Age of Onset    No Known Problems Mother     Alcohol abuse Father         Social Hx  Social History     Socioeconomic History    Marital status:      Spouse name: Not on file    Number of children: Not on file    Years of education: Not on file    Highest education level: Not on file   Tobacco Use    Smoking status: Former Smoker     Packs/day: 0.25     Quit date:      Years since quittin.6    Smokeless tobacco: Never Used   Vaping Use    Vaping Use: Never used   Substance and Sexual Activity    Alcohol use: Never     Comment: social    Drug use: Never    Sexual activity: Yes     Social Determinants of Health     Financial Resource Strain:     Difficulty of Paying Living Expenses:    Food Insecurity:     Worried About [address] [facility] in the Last Year:    [address] in the Last Year:    Transportation Needs:     Lack of Transportation (Medical):  Lack of Transportation (Non-Medical):    Physical Activity:     Days of Exercise per Week:     Minutes of Exercise per Session:    Stress:     Feeling of Stress :    Social Connections:     Frequency of Communication with Friends and Family:     Frequency of Social Gatherings with Friends and Family:     Attends Religion Services:     Active Member of Clubs or Organizations:     Attends Club or Organization Meetings:     Marital Status:         Surgical Hx  Past Surgical History:   Procedure Laterality Date    HX CHOLECYSTECTOMY      HX HYSTERECTOMY  09/01/2019    partial        Vitals  There were no vitals taken for this visit. Patient-Reported Vitals 6/4/2021   Patient-Reported Weight 167   Patient-Reported Pulse -   Patient-Reported SpO2 -   Patient-Reported Systolic  -   Patient-Reported Diastolic -        ROS  Review of Systems   Constitutional: Negative for chills, fever and malaise/fatigue. HENT: Negative for congestion, ear pain, sinus pain and sore throat. Eyes: Negative for blurred vision and redness. Respiratory: Negative for cough, sputum production, shortness of breath and wheezing. Cardiovascular: Negative for chest pain, palpitations and leg swelling. Gastrointestinal: Negative for abdominal pain, constipation, diarrhea, heartburn, nausea and vomiting. Genitourinary: Positive for dysuria and frequency. Negative for hematuria. Musculoskeletal: Negative for falls, joint pain and myalgias. Skin: Negative for rash. Neurological: Negative for dizziness, tingling, tremors, seizures, weakness and headaches. Endo/Heme/Allergies: Does not bruise/bleed easily. Psychiatric/Behavioral: Negative for depression, hallucinations, memory loss and suicidal ideas. The patient is not nervous/anxious and does not have insomnia. Physical Exam  Patient is a 51y.o. year old female     [facility] as visit was completed via telemedicine. Assessment/Plan  1. Urinary tract infection without hematuria, site unspecified  Will go ahead and treat for suspected uti but she will go to lab for urine studies prior to starting any medication. Follow up for worsening or unresolved symptoms or go to nearest ER  - URINALYSIS W/ RFLX MICROSCOPIC; Future  - CULTURE, URINE; Future  - nitrofurantoin, macrocrystal-monohydrate, (Macrobid) 100 mg capsule; Take 1 Capsule by mouth two (2) times a day for 5 days. Dispense: 10 Capsule; Refill: 0         Health Maintenance Items reviewed with patient; updates completed if applicable. >21 minutes spent with patient/reviewing records during virtual appt discussing diagnoses, treatment options, and answering any patient questions. Disclaimer:  I have discussed the diagnosis with the patient and the intended plan as seen above. The patient understands our medical plan. The risks, benefits and significant side effects of all medications have been reviewed. Anticipated time course and progression of condition reviewed. All questions have been addressed. He received an after visit summary, with information reviewed, and questions answered. Where appropriate, he/she is instructed to call the clinic if he/she has not been notified either by phone or through [employer] with the results of his/her tests or with an appointment plan for any referrals within 1 week(s). The patient  is to call if his condition worsens or fails to improve or if significant side effects are experienced.      Emma Byrne, LINDY-BC

## 2021-10-21 ENCOUNTER — HOSPITAL ENCOUNTER (OUTPATIENT)
Dept: LAB | Age: 46
Discharge: HOME | End: 2021-10-21

## 2021-10-21 LAB — SENTARA SPECIMEN COL,SENBCF: NORMAL

## 2021-10-21 PROCEDURE — 99001 SPECIMEN HANDLING PT-LAB: CPT

## 2022-02-14 ENCOUNTER — OFFICE VISIT (OUTPATIENT)
Dept: FAMILY MEDICINE CLINIC | Age: 47
End: 2022-02-14
Payer: MEDICAID

## 2022-02-14 VITALS
OXYGEN SATURATION: 98 % | SYSTOLIC BLOOD PRESSURE: 112 MMHG | RESPIRATION RATE: 18 BRPM | HEIGHT: 65 IN | DIASTOLIC BLOOD PRESSURE: 84 MMHG | TEMPERATURE: 97.1 F | WEIGHT: 173.2 LBS | HEART RATE: 74 BPM | BODY MASS INDEX: 28.86 KG/M2

## 2022-02-14 DIAGNOSIS — Z00.00 WELLNESS EXAMINATION: ICD-10-CM

## 2022-02-14 DIAGNOSIS — R63.5 WEIGHT GAIN: ICD-10-CM

## 2022-02-14 DIAGNOSIS — Z98.890 HISTORY OF BILATERAL BREAST REDUCTION SURGERY: ICD-10-CM

## 2022-02-14 DIAGNOSIS — K21.9 GERD WITHOUT ESOPHAGITIS: Primary | ICD-10-CM

## 2022-02-14 PROCEDURE — 99203 OFFICE O/P NEW LOW 30 MIN: CPT | Performed by: NURSE PRACTITIONER

## 2022-02-14 RX ORDER — PHENTERMINE HYDROCHLORIDE 37.5 MG/1
37.5 TABLET ORAL EVERY MORNING
Qty: 30 TABLET | Refills: 2 | Status: SHIPPED | OUTPATIENT
Start: 2022-02-14 | End: 2022-10-13

## 2022-02-14 NOTE — PROGRESS NOTES
Pratibhacharu Perla presents today for   Chief Complaint   Patient presents with    New Patient       Is someone accompanying this pt? No    Is the patient using any DME equipment during OV? No    Depression Screening:  3 most recent PHQ Screens 2/14/2022   PHQ Not Done -   Little interest or pleasure in doing things Not at all   Feeling down, depressed, irritable, or hopeless Not at all   Total Score PHQ 2 0   Trouble falling or staying asleep, or sleeping too much -   Feeling tired or having little energy -   Poor appetite, weight loss, or overeating -   Feeling bad about yourself - or that you are a failure or have let yourself or your family down -   Trouble concentrating on things such as school, work, reading, or watching TV -   Moving or speaking so slowly that other people could have noticed; or the opposite being so fidgety that others notice -   Thoughts of being better off dead, or hurting yourself in some way -   PHQ 9 Score -   How difficult have these problems made it for you to do your work, take care of your home and get along with others -       Learning Assessment:  Learning Assessment 11/11/2020   PRIMARY LEARNER Patient   PRIMARY LANGUAGE ENGLISH   LEARNER PREFERENCE PRIMARY DEMONSTRATION   ANSWERED BY patient   RELATIONSHIP SELF       Fall Risk  Fall Risk Assessment, last 12 mths 2/9/2021   Able to walk? Yes   Fall in past 12 months? 0       Health Maintenance reviewed and discussed and ordered per Provider. Health Maintenance Due   Topic Date Due    Hepatitis C Screening  Never done    COVID-19 Vaccine (1) Never done    DTaP/Tdap/Td series (1 - Tdap) Never done    Lipid Screen  Never done    Colorectal Cancer Screening Combo  Never done    Flu Vaccine (1) Never done   . Coordination of Care:  1. Have you been to the ER, urgent care clinic since your last visit? Hospitalized since your last visit? NO    2.  Have you seen or consulted any other health care providers outside of the [facility] [address] since your last visit? Include any pap smears or colon screening.  NO

## 2022-02-16 NOTE — PROGRESS NOTES
Patricia Velazquez is a 51 y. o.female presents with   Chief Complaint   Patient presents with    New Patient       65-year-old female presents today in office to establish care and for annual adult preventive exam. Patient states she recently had breast reduction surgery which she relays \"the surgery went well also the recovery was very smooth \". Patient goes on to state \"now that I have gotten that done I feel like I just cannot get the additional weight off \". She goes on to relay she has been on phentermine in the past and it worked well. Subjective:           Past Medical History:   Diagnosis Date    Degeneration of cervical intervertebral disc     Depression     Menopause     Overweight      Past Surgical History:   Procedure Laterality Date    HX BREAST REDUCTION  2021    HX  SECTION      x2    HX CHOLECYSTECTOMY      HX HYSTERECTOMY  2019    partial     Social History     Socioeconomic History    Marital status:    Tobacco Use    Smoking status: Former Smoker     Packs/day: 0.25     Years: 5.00     Pack years: 1.25     Quit date:      Years since quittin.1    Smokeless tobacco: Never Used   Vaping Use    Vaping Use: Never used   Substance and Sexual Activity    Alcohol use: Never     Comment: social    Drug use: Never    Sexual activity: Yes     Current Outpatient Medications   Medication Sig Dispense Refill    phentermine (ADIPEX-P) 37.5 mg tablet Take 1 Tablet by mouth Every morning. Max Daily Amount: 37.5 mg. 30 Tablet 2    omeprazole (PRILOSEC) 40 mg capsule TAKE 1 CAPSULE BY MOUTH DAILY (Patient taking differently: TAKE 1 CAPSULE BY MOUTH DAILY PRN) 30 Capsule 5     Allergies   Allergen Reactions    Sulfa (Sulfonamide Antibiotics) Other (comments)     Abdominal pain     The patient has a family history of    REVIEW OF SYSTEMS  Review of Systems   Constitutional: Negative for chills and fever.    HENT: Negative for ear discharge, ear pain, hearing loss, sinus pain and sore throat. Eyes: Negative for pain. Respiratory: Negative for cough and shortness of breath. Cardiovascular: Negative for chest pain, palpitations and leg swelling. Gastrointestinal: Negative for abdominal pain, nausea and vomiting. Genitourinary: Negative for dysuria, frequency and urgency. Musculoskeletal: Negative for falls, myalgias and neck pain. Skin: Negative for rash. Neurological: Negative for dizziness, tingling, tremors and headaches. Psychiatric/Behavioral: Negative for depression, substance abuse and suicidal ideas. The patient is not nervous/anxious and does not have insomnia. Objective:     Visit Vitals  /84 (BP 1 Location: Left upper arm, BP Patient Position: Sitting, BP Cuff Size: Large adult)   Pulse 74   Temp 97.1 °F (36.2 °C) (Temporal)   Resp 18   Ht 5' 5\" (1.651 m)   Wt 173 lb 3.2 oz (78.6 kg)   SpO2 98%   BMI 28.82 kg/m²       Current Outpatient Medications   Medication Instructions    omeprazole (PRILOSEC) 40 mg capsule TAKE 1 CAPSULE BY MOUTH DAILY    phentermine (ADIPEX-P) 37.5 mg, Oral, EVERY MORNING        PHYSICAL EXAM  Physical Exam  Constitutional:       General: She is not in acute distress. Appearance: She is obese. She is not ill-appearing. HENT:      Head: Normocephalic and atraumatic. Right Ear: External ear normal.      Left Ear: External ear normal.   Eyes:      Conjunctiva/sclera: Conjunctivae normal.   Cardiovascular:      Rate and Rhythm: Normal rate and regular rhythm. Pulses: Normal pulses. Heart sounds: Normal heart sounds. Pulmonary:      Effort: Pulmonary effort is normal.      Breath sounds: Normal breath sounds. Musculoskeletal:         General: Normal range of motion. Cervical back: Normal range of motion and neck supple. No rigidity. No muscular tenderness. Lymphadenopathy:      Cervical: No cervical adenopathy. Skin:     General: Skin is warm and dry.       Capillary Refill: Capillary refill takes less than 2 seconds. Neurological:      General: No focal deficit present. Mental Status: She is alert and oriented to person, place, and time. Cranial Nerves: No cranial nerve deficit. Psychiatric:         Mood and Affect: Mood normal.         Behavior: Behavior normal.         Thought Content: Thought content normal.         Judgment: Judgment normal.         Assessment/Plan:     Diagnoses and all orders for this visit:    1. GERD without esophagitis    2. Wellness examination  -     CBC W/O DIFF  -     HEPATITIS C AB  -     LIPID PANEL  -     METABOLIC PANEL, COMPREHENSIVE  -     VITAMIN D, 25 HYDROXY  -     VITAMIN B12  -     TSH 3RD GENERATION    3. History of bilateral breast reduction surgery    4. Weight gain  -     phentermine (ADIPEX-P) 37.5 mg tablet; Take 1 Tablet by mouth Every morning. Max Daily Amount: 37.5 mg. Labs today any changes medication regimen be contingent upon those findings. Starting patient on phentermine she will follow-up in office in 3 months or sooner as needed. Follow-up and Dispositions    · Return in about 3 months (around 5/14/2022) for in office. Disclaimer:    I have discussed the diagnosis with the patient and the intended plan as seen above. The patient understands our medical plan. The risks, benefits and significant side effects of all medications have been reviewed. Anticipated time course and progression of condition reviewed. All questions have been addressed. She received an after visit summary, with information reviewed, and questions answered. Where appropriate, she is instructed to call the clinic if she has not been notified either by phone or through [address] with the results of her tests or with an appointment plan for any referrals within 1 week(s). The patient  is to call if her condition worsens or fails to improve or if significant side effects are experienced.        Harleen Larkin NP

## 2022-03-18 PROBLEM — F41.1 GAD (GENERALIZED ANXIETY DISORDER): Status: ACTIVE | Noted: 2020-11-11

## 2022-03-18 PROBLEM — N62 LARGE BREASTS: Status: ACTIVE | Noted: 2021-04-19

## 2022-03-18 PROBLEM — G89.29 CHRONIC BACK PAIN: Status: ACTIVE | Noted: 2021-04-19

## 2022-03-18 PROBLEM — Z71.3 WEIGHT LOSS COUNSELING, ENCOUNTER FOR: Status: ACTIVE | Noted: 2021-02-09

## 2022-03-18 PROBLEM — M50.30 DEGENERATION OF CERVICAL INTERVERTEBRAL DISC: Status: ACTIVE | Noted: 2020-12-15

## 2022-03-18 PROBLEM — F32.9 MAJOR DEPRESSIVE DISORDER WITH CURRENT ACTIVE EPISODE: Status: ACTIVE | Noted: 2020-11-11

## 2022-03-18 PROBLEM — M54.9 CHRONIC BACK PAIN: Status: ACTIVE | Noted: 2021-04-19

## 2022-03-19 PROBLEM — R63.4 WEIGHT LOSS: Status: ACTIVE | Noted: 2021-02-09

## 2022-03-19 PROBLEM — E66.811 CLASS 1 OBESITY WITH BODY MASS INDEX (BMI) OF 30.0 TO 30.9 IN ADULT: Status: ACTIVE | Noted: 2021-02-09

## 2022-03-19 PROBLEM — E66.9 CLASS 1 OBESITY WITH BODY MASS INDEX (BMI) OF 30.0 TO 30.9 IN ADULT: Status: ACTIVE | Noted: 2021-02-09

## 2022-03-19 PROBLEM — K21.9 GERD WITHOUT ESOPHAGITIS: Status: ACTIVE | Noted: 2020-12-07

## 2022-03-20 PROBLEM — E66.3 OVERWEIGHT (BMI 25.0-29.9): Status: ACTIVE | Noted: 2021-05-06

## 2022-05-10 ENCOUNTER — HOSPITAL ENCOUNTER (OUTPATIENT)
Dept: LAB | Age: 47
Discharge: HOME | End: 2022-05-10

## 2022-05-10 PROCEDURE — 99001 SPECIMEN HANDLING PT-LAB: CPT

## 2022-10-13 ENCOUNTER — HOSPITAL ENCOUNTER (OUTPATIENT)
Dept: LAB | Age: 47
Discharge: HOME | End: 2022-10-13

## 2022-10-13 ENCOUNTER — TELEPHONE (OUTPATIENT)
Dept: FAMILY MEDICINE CLINIC | Age: 47
End: 2022-10-13

## 2022-10-13 ENCOUNTER — OFFICE VISIT (OUTPATIENT)
Dept: FAMILY MEDICINE CLINIC | Age: 47
End: 2022-10-13
Payer: MEDICAID

## 2022-10-13 VITALS
HEART RATE: 84 BPM | DIASTOLIC BLOOD PRESSURE: 86 MMHG | BODY MASS INDEX: 28.29 KG/M2 | RESPIRATION RATE: 18 BRPM | SYSTOLIC BLOOD PRESSURE: 126 MMHG | OXYGEN SATURATION: 98 % | WEIGHT: 169.8 LBS | TEMPERATURE: 97.3 F | HEIGHT: 65 IN

## 2022-10-13 DIAGNOSIS — Z12.31 SCREENING MAMMOGRAM FOR BREAST CANCER: Primary | ICD-10-CM

## 2022-10-13 DIAGNOSIS — Z12.11 SCREENING FOR MALIGNANT NEOPLASM OF COLON: ICD-10-CM

## 2022-10-13 DIAGNOSIS — Z00.00 WELLNESS EXAMINATION: ICD-10-CM

## 2022-10-13 DIAGNOSIS — K21.9 GERD WITHOUT ESOPHAGITIS: ICD-10-CM

## 2022-10-13 PROCEDURE — 99001 SPECIMEN HANDLING PT-LAB: CPT

## 2022-10-13 PROCEDURE — 99396 PREV VISIT EST AGE 40-64: CPT | Performed by: NURSE PRACTITIONER

## 2022-10-13 PROCEDURE — 36415 COLL VENOUS BLD VENIPUNCTURE: CPT

## 2022-10-13 RX ORDER — RANITIDINE 150 MG/1
150 TABLET, FILM COATED ORAL 2 TIMES DAILY
Qty: 90 TABLET | Refills: 1 | Status: SHIPPED | OUTPATIENT
Start: 2022-10-13

## 2022-10-13 RX ORDER — ALUMINUM ZIRCONIUM OCTACHLOROHYDREX GLY 16 G/100G
GEL TOPICAL
Qty: 90 CAPSULE | Refills: 2 | Status: SHIPPED | OUTPATIENT
Start: 2022-10-13

## 2022-10-13 NOTE — PROGRESS NOTES
JOCELYN Cruz is a 49 y.o. female and presents today for GI Problem  . Patient also presents today for routine wellness exam.  She complains of increasing reflux. She states that she has worked to omit trigger foods. She states she was on omeprazole in the distant past however she ran out states she has been taking Zantac as needed. Allergies    Allergies   Allergen Reactions    Sulfa (Sulfonamide Antibiotics) Other (comments)     Abdominal pain        Medications    Current Outpatient Medications   Medication Sig Dispense    Bifidobacterium Infantis (Align) 4 mg cap 1 cap po once daily 90 Capsule    raNITIdine (ZANTAC) 150 mg tablet Take 1 Tablet by mouth two (2) times a day. 90 Tablet    omeprazole (PRILOSEC) 40 mg capsule TAKE 1 CAPSULE BY MOUTH DAILY (Patient not taking: Reported on 10/13/2022) 30 Capsule     No current facility-administered medications for this visit.         Health Maintenance    Health Maintenance Due   Topic Date Due    Hepatitis C Screening  Never done    COVID-19 Vaccine (1) Never done    DTaP/Tdap/Td series (1 - Tdap) Never done    Cervical cancer screen  Never done    Lipid Screen  Never done    Colorectal Cancer Screening Combo  Never done    Flu Vaccine (1) Never done        Problem List    Patient Active Problem List    Diagnosis Date Noted    Overweight (BMI 25.0-29.9) 05/06/2021    Chronic back pain 04/19/2021    Large breasts 04/19/2021    Weight loss 02/09/2021    Weight loss counseling, encounter for 02/09/2021    Class 1 obesity with body mass index (BMI) of 30.0 to 30.9 in adult 02/09/2021    Degeneration of cervical intervertebral disc 12/15/2020    GERD without esophagitis 12/07/2020    CRISTOFER (generalized anxiety disorder) 11/11/2020    Major depressive disorder with current active episode 11/11/2020        Family Hx    Family History   Problem Relation Age of Onset    No Known Problems Mother     Alcohol abuse Father         Social Hx    Social History Socioeconomic History    Marital status:    Tobacco Use    Smoking status: Former     Packs/day: 0.25     Years: 5.00     Pack years: 1.25     Types: Cigarettes     Quit date:      Years since quittin.7    Smokeless tobacco: Never   Vaping Use    Vaping Use: Never used   Substance and Sexual Activity    Alcohol use: Never     Comment: social    Drug use: Never    Sexual activity: Yes        Surgical Hx    Past Surgical History:   Procedure Laterality Date    HX BREAST REDUCTION  2021    HX  SECTION      x2    HX CHOLECYSTECTOMY      HX HYSTERECTOMY  2019    partial          Vitals    Visit Vitals  /86 (BP 1 Location: Right upper arm, BP Patient Position: Sitting, BP Cuff Size: Adult)   Pulse 84   Temp 97.3 °F (36.3 °C) (Temporal)   Resp 18   Ht 5' 5\" (1.651 m)   Wt 169 lb 12.8 oz (77 kg)   SpO2 98%   BMI 28.26 kg/m²        ROS    Review of Systems   Constitutional:  Negative for chills and fever. HENT:  Negative for ear discharge, ear pain, hearing loss, sinus pain and sore throat. Eyes:  Negative for pain. Respiratory:  Negative for cough and shortness of breath. Cardiovascular:  Negative for chest pain, palpitations and leg swelling. Gastrointestinal:  Positive for heartburn. Negative for abdominal pain, nausea and vomiting. Genitourinary:  Negative for dysuria, frequency and urgency. Musculoskeletal:  Negative for falls, myalgias and neck pain. Skin:  Negative for rash. Neurological:  Negative for dizziness, tingling, tremors and headaches. Psychiatric/Behavioral:  Negative for depression, substance abuse and suicidal ideas. The patient is not nervous/anxious and does not have insomnia. Physical Exam    Physical Exam  Constitutional:       Appearance: Normal appearance. Musculoskeletal:      Right lower leg: No edema. Left lower leg: No edema. Neurological:      Mental Status: She is alert and oriented to person, place, and time. Psychiatric:         Behavior: Behavior normal.        Assessment/Plan    Diagnoses and all orders for this visit:    1. Screening mammogram for breast cancer  -     [facility] 3D JOSE W MAMMO BI SCREENING INCL CAD; Future    2. Screening for malignant neoplasm of colon  -     REFERRAL TO COLON AND RECTAL SURGERY    3. Wellness examination  -     CBC W/O DIFF; Future  -     METABOLIC PANEL, COMPREHENSIVE; Future  -     LIPID PANEL; Future  -     VITAMIN D, 25 HYDROXY; Future  -     VITAMIN B12; Future  -     TSH 3RD GENERATION; Future  -     HEMOGLOBIN A1C W/O EAG    4. GERD without esophagitis  I counseled patient on continuing to limit foods that exacerbate her reflux. She is agreeable to starting probiotics I also urged her to review reputable websites in regards to improving gut health. While it seems counterintuitive prescribing a probiotic in conjunction with the Zantac, I want to keep the patient comfortable while she is working to improve her health. Other orders  -     Bifidobacterium Infantis (Align) 4 mg cap; 1 cap po once daily  -     raNITIdine (ZANTAC) 150 mg tablet; Take 1 Tablet by mouth two (2) times a day. Health Maintenance Items reviewed with patient as noted.

## 2022-10-13 NOTE — PROGRESS NOTES
Salome Vanegas presents today for   Chief Complaint   Patient presents with    GI Problem       Is someone accompanying this pt? No    Is the patient using any DME equipment during OV? NO    Depression Screening:  3 most recent PHQ Screens 10/13/2022   PHQ Not Done -   Little interest or pleasure in doing things Not at all   Feeling down, depressed, irritable, or hopeless Not at all   Total Score PHQ 2 0   Trouble falling or staying asleep, or sleeping too much -   Feeling tired or having little energy -   Poor appetite, weight loss, or overeating -   Feeling bad about yourself - or that you are a failure or have let yourself or your family down -   Trouble concentrating on things such as school, work, reading, or watching TV -   Moving or speaking so slowly that other people could have noticed; or the opposite being so fidgety that others notice -   Thoughts of being better off dead, or hurting yourself in some way -   PHQ 9 Score -   How difficult have these problems made it for you to do your work, take care of your home and get along with others -       Learning Assessment:  Learning Assessment 11/11/2020   PRIMARY LEARNER Patient   PRIMARY LANGUAGE ENGLISH   LEARNER PREFERENCE PRIMARY DEMONSTRATION   ANSWERED BY patient   RELATIONSHIP SELF       Fall Risk  Fall Risk Assessment, last 12 mths 2/9/2021   Able to walk? Yes   Fall in past 12 months? 0       Health Maintenance reviewed and discussed and ordered per Provider. Health Maintenance Due   Topic Date Due    Hepatitis C Screening  Never done    COVID-19 Vaccine (1) Never done    DTaP/Tdap/Td series (1 - Tdap) Never done    Cervical cancer screen  Never done    Lipid Screen  Never done    Colorectal Cancer Screening Combo  Never done    Flu Vaccine (1) Never done   . Coordination of Care:    1. Have you been to the ER, urgent care clinic since your last visit? Hospitalized since your last visit? No    2.  Have you seen or consulted any other health care providers outside of the [address] [place] [address] since your last visit? Include any pap smears or colon screening. no    3. For patients aged 39-70: Has the patient had a colonoscopy / FIT/ Cologuard? No - ordered today      If the patient is female:    4. For patients aged 41-77: Has the patient had a mammogram within the past 2 years? No - ordered today      5. For patients aged 21-65: Has the patient had a pap smear? Yes - Care Gap present.  Rooming MA/LPN to request most recent results

## 2022-10-14 LAB — HBA1C MFR BLD HPLC: 5.3 %

## 2023-02-09 ENCOUNTER — OFFICE VISIT (OUTPATIENT)
Dept: FAMILY MEDICINE CLINIC | Age: 48
End: 2023-02-09
Payer: MEDICAID

## 2023-02-09 VITALS
RESPIRATION RATE: 19 BRPM | OXYGEN SATURATION: 98 % | DIASTOLIC BLOOD PRESSURE: 86 MMHG | HEIGHT: 65 IN | SYSTOLIC BLOOD PRESSURE: 132 MMHG | BODY MASS INDEX: 29.16 KG/M2 | TEMPERATURE: 97.2 F | WEIGHT: 175 LBS | HEART RATE: 81 BPM

## 2023-02-09 DIAGNOSIS — R63.5 WEIGHT GAIN: ICD-10-CM

## 2023-02-09 DIAGNOSIS — Z12.11 SCREENING FOR MALIGNANT NEOPLASM OF COLON: Primary | ICD-10-CM

## 2023-02-09 PROCEDURE — 99213 OFFICE O/P EST LOW 20 MIN: CPT | Performed by: NURSE PRACTITIONER

## 2023-02-09 RX ORDER — PHENTERMINE HYDROCHLORIDE 37.5 MG/1
37.5 TABLET ORAL EVERY MORNING
Qty: 30 TABLET | Refills: 2 | Status: SHIPPED | OUTPATIENT
Start: 2023-02-09

## 2023-02-09 NOTE — PROGRESS NOTES
Puma Hu presents today for   Chief Complaint   Patient presents with    Weight Management       Is someone accompanying this pt? No    Is the patient using any DME equipment during OV? No    Depression Screening:  3 most recent PHQ Screens 2/9/2023   PHQ Not Done -   Little interest or pleasure in doing things Not at all   Feeling down, depressed, irritable, or hopeless Not at all   Total Score PHQ 2 0   Trouble falling or staying asleep, or sleeping too much -   Feeling tired or having little energy -   Poor appetite, weight loss, or overeating -   Feeling bad about yourself - or that you are a failure or have let yourself or your family down -   Trouble concentrating on things such as school, work, reading, or watching TV -   Moving or speaking so slowly that other people could have noticed; or the opposite being so fidgety that others notice -   Thoughts of being better off dead, or hurting yourself in some way -   PHQ 9 Score -   How difficult have these problems made it for you to do your work, take care of your home and get along with others -       Learning Assessment:  Learning Assessment 11/11/2020   PRIMARY LEARNER Patient   PRIMARY LANGUAGE ENGLISH   LEARNER PREFERENCE PRIMARY DEMONSTRATION   ANSWERED BY patient   RELATIONSHIP SELF       Fall Risk  Fall Risk Assessment, last 12 mths 2/9/2021   Able to walk? Yes   Fall in past 12 months? 0       Health Maintenance reviewed and discussed and ordered per Provider. Health Maintenance Due   Topic Date Due    Hepatitis C Screening  Never done    COVID-19 Vaccine (1) Never done    DTaP/Tdap/Td series (1 - Tdap) Never done    Lipid Screen  Never done    Colorectal Cancer Screening Combo  Never done    Flu Vaccine (1) Never done   . Coordination of Care:    1. Have you been to the ER, urgent care clinic since your last visit? Hospitalized since your last visit? No    2.  Have you seen or consulted any other health care providers outside of the [facility] [address] [facility] since your last visit? Include any pap smears or colon screening. No    3. For patients aged 39-70: Has the patient had a colonoscopy / FIT/ Cologuard? No      If the patient is female:    4. For patients aged 41-77: Has the patient had a mammogram within the past 2 years? No      5. For patients aged 21-65: Has the patient had a pap smear?  No

## 2023-02-09 NOTE — PROGRESS NOTES
HPI    Erin Jameson is a 49 y.o. female and presents today for Weight Management  . Allergies    Allergies   Allergen Reactions    Sulfa (Sulfonamide Antibiotics) Other (comments)     Abdominal pain        Medications    Current Outpatient Medications   Medication Sig Dispense    phentermine (ADIPEX-P) 37.5 mg tablet Take 1 Tablet by mouth Every morning. Max Daily Amount: 37.5 mg. 30 Tablet    Bifidobacterium Infantis (Align) 4 mg cap 1 cap po once daily 90 Capsule     No current facility-administered medications for this visit.         Health Maintenance    Health Maintenance Due   Topic Date Due    Hepatitis C Screening  Never done    COVID-19 Vaccine (1) Never done    DTaP/Tdap/Td series (1 - Tdap) Never done    Lipid Screen  Never done    Colorectal Cancer Screening Combo  Never done    Flu Vaccine (1) Never done        Problem List    Patient Active Problem List    Diagnosis Date Noted    Overweight (BMI 25.0-29.9) 2021    Chronic back pain 2021    Large breasts 2021    Weight loss 2021    Weight loss counseling, encounter for 2021    Class 1 obesity with body mass index (BMI) of 30.0 to 30.9 in adult 2021    Degeneration of cervical intervertebral disc 12/15/2020    GERD without esophagitis 2020    CRISTOFER (generalized anxiety disorder) 2020    Major depressive disorder with current active episode 2020        Family Hx    Family History   Problem Relation Age of Onset    No Known Problems Mother     Alcohol abuse Father         Social Hx    Social History     Socioeconomic History    Marital status:    Tobacco Use    Smoking status: Former     Packs/day: 0.25     Years: 5.00     Pack years: 1.25     Types: Cigarettes     Quit date:      Years since quittin.1    Smokeless tobacco: Never   Vaping Use    Vaping Use: Never used   Substance and Sexual Activity    Alcohol use: Never     Comment: social    Drug use: Never    Sexual activity: Yes Surgical Hx    Past Surgical History:   Procedure Laterality Date    HX BREAST REDUCTION  2021    HX  SECTION      x2    HX CHOLECYSTECTOMY      HX HYSTERECTOMY  2019    partial          Vitals    Visit Vitals  /86 (BP 1 Location: Right upper arm, BP Patient Position: Sitting, BP Cuff Size: Large adult)   Pulse 81   Temp 97.2 °F (36.2 °C) (Temporal)   Resp 19   Ht 5' 5\" (1.651 m)   Wt 175 lb (79.4 kg)   SpO2 98%   BMI 29.12 kg/m²        ROS    Review of Systems   Constitutional:  Positive for malaise/fatigue. Respiratory:  Negative for shortness of breath. Cardiovascular:  Negative for chest pain and palpitations. Physical Exam    Physical Exam  Constitutional:       Appearance: She is obese. Cardiovascular:      Rate and Rhythm: Regular rhythm. Heart sounds: Normal heart sounds. Neurological:      Mental Status: She is alert. Assessment/Plan    Diagnoses and all orders for this visit:    1. Screening for malignant neoplasm of colon  -     REFERRAL TO COLON AND RECTAL SURGERY    2. Weight gain  -     phentermine (ADIPEX-P) 37.5 mg tablet; Take 1 Tablet by mouth Every morning. Max Daily Amount: 37.5 mg.         Health Maintenance Items reviewed with patient as noted.

## 2023-02-28 ENCOUNTER — NURSE ONLY (OUTPATIENT)
Age: 48
End: 2023-02-28

## 2023-02-28 DIAGNOSIS — Z12.11 ENCOUNTER FOR SCREENING COLONOSCOPY: Primary | ICD-10-CM

## 2023-02-28 RX ORDER — POLYETHYLENE GLYCOL 3350, SODIUM SULFATE ANHYDROUS, SODIUM BICARBONATE, SODIUM CHLORIDE, POTASSIUM CHLORIDE 236; 22.74; 6.74; 5.86; 2.97 G/4L; G/4L; G/4L; G/4L; G/4L
4 POWDER, FOR SOLUTION ORAL ONCE
Qty: 4000 ML | Refills: 0 | Status: SHIPPED | OUTPATIENT
Start: 2023-02-28 | End: 2023-02-28

## 2023-03-10 ENCOUNTER — ANESTHESIA EVENT (OUTPATIENT)
Age: 48
End: 2023-03-10
Payer: MEDICAID

## 2023-03-23 ENCOUNTER — PREP FOR PROCEDURE (OUTPATIENT)
Age: 48
End: 2023-03-23

## 2023-03-23 DIAGNOSIS — Z12.11 COLON CANCER SCREENING: Primary | ICD-10-CM

## 2023-03-23 RX ORDER — SODIUM CHLORIDE, SODIUM LACTATE, POTASSIUM CHLORIDE, CALCIUM CHLORIDE 600; 310; 30; 20 MG/100ML; MG/100ML; MG/100ML; MG/100ML
INJECTION, SOLUTION INTRAVENOUS CONTINUOUS
Status: CANCELLED | OUTPATIENT
Start: 2023-03-23

## 2023-03-23 NOTE — H&P
healthy female that is stable and here for colon cancer screening via colonoscopy. Recommendations:  1. Colonoscopy with MAC. The risks, benefits, adverse reactions including death and the possibility of missed lesions were neoplasia were discussed in detail today with the patient prior to the procedure. They understand and agreed to undergo the procedure. 2.  Further recommendations pending their clinical course. 3.  Followup as needed.

## 2023-03-29 ENCOUNTER — ANESTHESIA (OUTPATIENT)
Age: 48
End: 2023-03-29
Payer: MEDICAID

## 2023-03-29 ENCOUNTER — HOSPITAL ENCOUNTER (OUTPATIENT)
Age: 48
Setting detail: OUTPATIENT SURGERY
Discharge: HOME | End: 2023-03-29
Attending: INTERNAL MEDICINE | Admitting: INTERNAL MEDICINE
Payer: MEDICAID

## 2023-03-29 VITALS
HEIGHT: 64 IN | DIASTOLIC BLOOD PRESSURE: 76 MMHG | SYSTOLIC BLOOD PRESSURE: 138 MMHG | RESPIRATION RATE: 16 BRPM | BODY MASS INDEX: 28.68 KG/M2 | TEMPERATURE: 98 F | HEART RATE: 90 BPM | OXYGEN SATURATION: 98 % | WEIGHT: 168 LBS

## 2023-03-29 DIAGNOSIS — Z12.11 COLON CANCER SCREENING: ICD-10-CM

## 2023-03-29 PROCEDURE — 2580000003 HC RX 258: Performed by: NURSE ANESTHETIST, CERTIFIED REGISTERED

## 2023-03-29 PROCEDURE — 3700000001 HC ADD 15 MINUTES (ANESTHESIA): Performed by: INTERNAL MEDICINE

## 2023-03-29 PROCEDURE — 7100000010 HC PHASE II RECOVERY - FIRST 15 MIN: Performed by: INTERNAL MEDICINE

## 2023-03-29 PROCEDURE — 3700000000 HC ANESTHESIA ATTENDED CARE: Performed by: INTERNAL MEDICINE

## 2023-03-29 PROCEDURE — 7100000011 HC PHASE II RECOVERY - ADDTL 15 MIN: Performed by: INTERNAL MEDICINE

## 2023-03-29 PROCEDURE — 2709999900 HC NON-CHARGEABLE SUPPLY: Performed by: INTERNAL MEDICINE

## 2023-03-29 PROCEDURE — 45378 DIAGNOSTIC COLONOSCOPY: CPT | Performed by: INTERNAL MEDICINE

## 2023-03-29 PROCEDURE — 2500000003 HC RX 250 WO HCPCS: Performed by: NURSE ANESTHETIST, CERTIFIED REGISTERED

## 2023-03-29 PROCEDURE — 3600007501: Performed by: INTERNAL MEDICINE

## 2023-03-29 PROCEDURE — 3600007511: Performed by: INTERNAL MEDICINE

## 2023-03-29 RX ORDER — SODIUM CHLORIDE, SODIUM LACTATE, POTASSIUM CHLORIDE, CALCIUM CHLORIDE 600; 310; 30; 20 MG/100ML; MG/100ML; MG/100ML; MG/100ML
INJECTION, SOLUTION INTRAVENOUS CONTINUOUS
Status: DISCONTINUED | OUTPATIENT
Start: 2023-03-29 | End: 2023-03-29 | Stop reason: HOSPADM

## 2023-03-29 RX ORDER — SODIUM CHLORIDE 0.9 % (FLUSH) 0.9 %
5-40 SYRINGE (ML) INJECTION PRN
Status: DISCONTINUED | OUTPATIENT
Start: 2023-03-29 | End: 2023-03-29 | Stop reason: HOSPADM

## 2023-03-29 RX ORDER — SODIUM CHLORIDE 0.9 % (FLUSH) 0.9 %
5-40 SYRINGE (ML) INJECTION EVERY 12 HOURS SCHEDULED
Status: DISCONTINUED | OUTPATIENT
Start: 2023-03-29 | End: 2023-03-29 | Stop reason: HOSPADM

## 2023-03-29 RX ADMIN — LIDOCAINE HYDROCHLORIDE 20 MG: 20 INJECTION, SOLUTION INFILTRATION; PERINEURAL at 13:57

## 2023-03-29 RX ADMIN — SODIUM CHLORIDE, POTASSIUM CHLORIDE, SODIUM LACTATE AND CALCIUM CHLORIDE: 600; 310; 30; 20 INJECTION, SOLUTION INTRAVENOUS at 13:37

## 2023-03-29 ASSESSMENT — PAIN - FUNCTIONAL ASSESSMENT: PAIN_FUNCTIONAL_ASSESSMENT: NONE - DENIES PAIN

## 2023-03-29 NOTE — INTERVAL H&P NOTE
Update History & Physical    The patient's History and Physical of March 29, 2023 was reviewed with the patient and I examined the patient. There was no change. The surgical site was confirmed by the patient and me. Plan: The risks, benefits, expected outcome, and alternative to the recommended procedure have been discussed with the patient. Patient understands and wants to proceed with the procedure.      Electronically signed by Miguel Mustafa MD on 3/29/2023 at 1:33 PM

## 2023-03-29 NOTE — ANESTHESIA PRE PROCEDURE
Department of Anesthesiology  Preprocedure Note       Name:  Alissa Wan   Age:  49 y.o.  :  1975                                          MRN:  [id number]         Date:  3/29/2023      Surgeon: Jhonny Virk):  Denae Caldwell MD    Procedure: Procedure(s):  COLONOSCOPY    Medications prior to admission:   Prior to Admission medications    Medication Sig Start Date End Date Taking? Authorizing Provider   Probiotic Product (ACIDOPHILUS PROBIOTIC) CAPS capsule 1 cap po once daily 10/13/22   Ar Automatic Reconciliation   raNITIdine (ZANTAC) 150 MG tablet Take 150 mg by mouth 2 times daily 10/13/22   Ar Automatic Reconciliation       Current medications:    No current facility-administered medications for this encounter. Allergies: Allergies   Allergen Reactions    Sulfa Antibiotics Other (See Comments)     Abdominal pain       Problem List:    Patient Active Problem List   Diagnosis Code    Weight loss counseling, encounter for Z71.3    Degeneration of cervical intervertebral disc M50.30    Major depressive disorder with current active episode F32.9    VIPIN (generalized anxiety disorder) F41.1    Large breasts N62    Chronic back pain M54.9, G89.29    Class 1 obesity with body mass index (BMI) of 30.0 to 30.9 in adult E66.9, Z68.30    GERD without esophagitis K21.9    Weight loss R63.4    Overweight (BMI 25.0-29. 9) E66.3       Past Medical History:        Diagnosis Date    Degeneration of cervical intervertebral disc     Depression     Menopause     Overweight        Past Surgical History:        Procedure Laterality Date    BREAST REDUCTION SURGERY  2021     SECTION      x2    CHOLECYSTECTOMY      HYSTERECTOMY (CERVIX STATUS UNKNOWN)  2019    partial       Social History:    Social History     Tobacco Use    Smoking status: Former     Packs/day: 0.25     Types: Cigarettes     Quit date: 1998     Years since quittin.2    Smokeless tobacco: Never

## 2023-03-29 NOTE — OP NOTE
Colonoscopy procedure note    Date of service: 3/29/2023    Type:  Screening    Indication for procedure: Colon cancer screening    Anesthesia classification: ASA class 2    Patient history and physical been accomplished and documented. Patient is assessed and determined to be appropriate candidate for planned procedure and sedation; patient reassessed immediately prior to sedation. Sedation plan: MAC per anesthesia    Surgical assistant: Not applicable    Airway assessment: Range of motion: Normal, mouth opening, Visual obstruction: No.    UPDATED PREOP EXAM:  Unchanged. VS: Reviewed  Gen: in NAD  CV: RRR, no murmur  Resp: CTA  Abd: Soft, NTND, +BS  Extrem: No cyanosis or edema  Neuro: Awake and alert    Informed consent obtained: Yes. The indications, risks including but not limited to bleeding, perforation, infection, death, and potential failure to visual areas are diagnosed neoplasia, alternatives and benefits were discussed with the patient prior to the procedure. Patient identity and procedure was verified, absent was obtained, and is consistent with the consent form found in the patient's records. PROCEDURE PERFORMED:  COLONOSCOPY  to the cecum with MAC     INSTRUMENT: Olympus colonoscope per nursing notes. FINDINGS:    External anal lesions: Normal   Rectum: normal.   Retroflexion view: Normal  Sigmoid: normal   Descending Colon: normal   Transverse Colon: normal   Ascending Colon: normal   Cecum: normal, including the appendiceal orifice and ileocecal valve. Terminal ileum: not evaluated     Specimens: none     Bowel preparation- adequate to detect small (5mm) polyps or larger. Estimated blood loss: none   Complications:  none   Cecal withdrawal time: 6 minutes. Comments:  none     Impression:  Normal colonoscopy to the cecum. No polyps or masses were seen. Recommendations:  Repeat colonoscopy in 10 years for screening. Follow up as needed.

## 2023-03-29 NOTE — ANESTHESIA POSTPROCEDURE EVALUATION
Department of Anesthesiology  Postprocedure Note    Patient: Kori Eli  MRN: [id number]  YOB: 1975  Date of evaluation: 3/29/2023      Procedure Summary     Date: 03/29/23 Room / Location: [facility] ENDO 01 / [facility] ENDOSCOPY    Anesthesia Start: 1339 Anesthesia Stop: 80    Procedure: COLONOSCOPY (Rectum) Diagnosis:       Special screening for malignant neoplasms, colon      (Special screening for malignant neoplasms, colon [Z12.11])    Surgeons: Francoise Yepez MD Responsible Provider: SAMANTHA Vargas CRNA    Anesthesia Type: TIVA ASA Status: 2          Anesthesia Type: TIVA    Ave Phase I: Ave Score: 10    Ave Phase II:        Anesthesia Post Evaluation    Patient location during evaluation: PACU  Patient participation: complete - patient participated  Level of consciousness: awake  Airway patency: patent  Nausea & Vomiting: no vomiting and no nausea  Complications: no  Cardiovascular status: blood pressure returned to baseline and hemodynamically stable  Respiratory status: room air  Hydration status: stable  Multimodal analgesia pain management approach

## 2023-06-21 ENCOUNTER — TELEPHONE (OUTPATIENT)
Dept: FAMILY MEDICINE CLINIC | Facility: CLINIC | Age: 48
End: 2023-06-21

## 2023-06-21 NOTE — TELEPHONE ENCOUNTER
Called pt to confirm appt tomorrow and to reminder her that Mrs. Leeanne Maxwell requires that pt's bring all of their medications. ECC if you get call please message us and let us know. Thank you.

## 2023-10-16 ENCOUNTER — OFFICE VISIT (OUTPATIENT)
Dept: FAMILY MEDICINE CLINIC | Facility: CLINIC | Age: 48
End: 2023-10-16
Payer: MEDICAID

## 2023-10-16 VITALS
DIASTOLIC BLOOD PRESSURE: 84 MMHG | HEIGHT: 64 IN | SYSTOLIC BLOOD PRESSURE: 135 MMHG | TEMPERATURE: 97.6 F | OXYGEN SATURATION: 98 % | RESPIRATION RATE: 18 BRPM | BODY MASS INDEX: 30.97 KG/M2 | WEIGHT: 181.4 LBS | HEART RATE: 80 BPM

## 2023-10-16 DIAGNOSIS — Z11.4 SCREENING FOR HIV WITHOUT PRESENCE OF RISK FACTORS: ICD-10-CM

## 2023-10-16 DIAGNOSIS — Z12.31 ENCOUNTER FOR SCREENING MAMMOGRAM FOR MALIGNANT NEOPLASM OF BREAST: ICD-10-CM

## 2023-10-16 DIAGNOSIS — K21.9 GASTRO-ESOPHAGEAL REFLUX DISEASE WITHOUT ESOPHAGITIS: ICD-10-CM

## 2023-10-16 DIAGNOSIS — Z11.59 NEED FOR HEPATITIS C SCREENING TEST: Primary | ICD-10-CM

## 2023-10-16 DIAGNOSIS — E66.9 CLASS 1 OBESITY WITHOUT SERIOUS COMORBIDITY WITH BODY MASS INDEX (BMI) OF 31.0 TO 31.9 IN ADULT, UNSPECIFIED OBESITY TYPE: ICD-10-CM

## 2023-10-16 DIAGNOSIS — Z00.00 ENCOUNTER FOR GENERAL ADULT MEDICAL EXAMINATION WITHOUT ABNORMAL FINDINGS: ICD-10-CM

## 2023-10-16 PROCEDURE — 90471 IMMUNIZATION ADMIN: CPT | Performed by: NURSE PRACTITIONER

## 2023-10-16 PROCEDURE — 90715 TDAP VACCINE 7 YRS/> IM: CPT | Performed by: NURSE PRACTITIONER

## 2023-10-16 PROCEDURE — 99396 PREV VISIT EST AGE 40-64: CPT | Performed by: NURSE PRACTITIONER

## 2023-10-16 PROCEDURE — 99213 OFFICE O/P EST LOW 20 MIN: CPT | Performed by: NURSE PRACTITIONER

## 2023-10-16 RX ORDER — SEMAGLUTIDE 0.25 MG/.5ML
0.25 INJECTION, SOLUTION SUBCUTANEOUS
Qty: 2 ML | Refills: 0 | Status: SHIPPED | OUTPATIENT
Start: 2023-10-16

## 2023-10-16 RX ORDER — ESOMEPRAZOLE MAGNESIUM 20 MG/1
20 FOR SUSPENSION ORAL DAILY
COMMUNITY

## 2023-10-16 SDOH — ECONOMIC STABILITY: FOOD INSECURITY: WITHIN THE PAST 12 MONTHS, YOU WORRIED THAT YOUR FOOD WOULD RUN OUT BEFORE YOU GOT MONEY TO BUY MORE.: NEVER TRUE

## 2023-10-16 SDOH — ECONOMIC STABILITY: INCOME INSECURITY: HOW HARD IS IT FOR YOU TO PAY FOR THE VERY BASICS LIKE FOOD, HOUSING, MEDICAL CARE, AND HEATING?: NOT VERY HARD

## 2023-10-16 SDOH — ECONOMIC STABILITY: FOOD INSECURITY: WITHIN THE PAST 12 MONTHS, THE FOOD YOU BOUGHT JUST DIDN'T LAST AND YOU DIDN'T HAVE MONEY TO GET MORE.: NEVER TRUE

## 2023-10-16 SDOH — ECONOMIC STABILITY: HOUSING INSECURITY
IN THE LAST 12 MONTHS, WAS THERE A TIME WHEN YOU DID NOT HAVE A STEADY PLACE TO SLEEP OR SLEPT IN A SHELTER (INCLUDING NOW)?: NO

## 2023-10-16 ASSESSMENT — ENCOUNTER SYMPTOMS
SHORTNESS OF BREATH: 0
CHEST TIGHTNESS: 0

## 2023-10-16 ASSESSMENT — PATIENT HEALTH QUESTIONNAIRE - PHQ9
SUM OF ALL RESPONSES TO PHQ QUESTIONS 1-9: 0
2. FEELING DOWN, DEPRESSED OR HOPELESS: 0
SUM OF ALL RESPONSES TO PHQ QUESTIONS 1-9: 0
4. FEELING TIRED OR HAVING LITTLE ENERGY: 0
8. MOVING OR SPEAKING SO SLOWLY THAT OTHER PEOPLE COULD HAVE NOTICED. OR THE OPPOSITE, BEING SO FIGETY OR RESTLESS THAT YOU HAVE BEEN MOVING AROUND A LOT MORE THAN USUAL: 0
SUM OF ALL RESPONSES TO PHQ9 QUESTIONS 1 & 2: 0
SUM OF ALL RESPONSES TO PHQ QUESTIONS 1-9: 0
3. TROUBLE FALLING OR STAYING ASLEEP: 0
SUM OF ALL RESPONSES TO PHQ QUESTIONS 1-9: 0
5. POOR APPETITE OR OVEREATING: 0
9. THOUGHTS THAT YOU WOULD BE BETTER OFF DEAD, OR OF HURTING YOURSELF: 0
7. TROUBLE CONCENTRATING ON THINGS, SUCH AS READING THE NEWSPAPER OR WATCHING TELEVISION: 0
10. IF YOU CHECKED OFF ANY PROBLEMS, HOW DIFFICULT HAVE THESE PROBLEMS MADE IT FOR YOU TO DO YOUR WORK, TAKE CARE OF THINGS AT HOME, OR GET ALONG WITH OTHER PEOPLE: 0
6. FEELING BAD ABOUT YOURSELF - OR THAT YOU ARE A FAILURE OR HAVE LET YOURSELF OR YOUR FAMILY DOWN: 0
1. LITTLE INTEREST OR PLEASURE IN DOING THINGS: 0

## 2023-10-16 NOTE — PROGRESS NOTES
Kevin Brown is a 51 y.o. female who presents for routine Tetanus vaccine. She denies any symptoms , reactions or allergies that would exclude them from being immunized today. Risks and adverse reactions were discussed and the VIS was given to them. All questions were addressed. IM injection administered to patient's Left deltoid. Bandage applied. She left office without being observed, at that time there was no reactions observed.     Elan Miranda LPN

## 2023-10-16 NOTE — PROGRESS NOTES
Hany Coleman presents today for   Chief Complaint   Patient presents with    Annual Exam       Is someone accompanying this pt? no    Is the patient using any DME equipment during OV? no    Depression Screening:      10/16/2023    10:36 AM 2/9/2023     9:33 AM 10/13/2022    10:51 AM 6/4/2021     1:11 PM 4/19/2021     9:31 AM 4/13/2021     2:12 PM 3/9/2021     4:08 PM   PHQ-9 Questionaire   Little interest or pleasure in doing things 0 0 0 0 0 0 0   Feeling down, depressed, or hopeless 0 0 0 0 0 0 0   Trouble falling or staying asleep, or sleeping too much 0      0   Feeling tired or having little energy 0      0   Poor appetite or overeating 0      0   Feeling bad about yourself - or that you are a failure or have let yourself or your family down 0      0   Trouble concentrating on things, such as reading the newspaper or watching television 0      0   Moving or speaking so slowly that other people could have noticed. Or the opposite - being so fidgety or restless that you have been moving around a lot more than usual 0      0   Thoughts that you would be better off dead, or of hurting yourself in some way 0         PHQ-9 Total Score 0 0 0 0 0 0 0   If you checked off any problems, how difficult have these problems made it for you to do your work, take care of things at home, or get along with other people? 0             Fall Risk       No data to display                 Health Maintenance reviewed and discussed and ordered per Provider. Health Maintenance Due   Topic Date Due    Hepatitis B vaccine (1 of 3 - 3-dose series) Never done    HIV screen  Never done    Hepatitis C screen  Never done    DTaP/Tdap/Td vaccine (1 - Tdap) Never done    Lipids  Never done   . Coordination of Care:    1. \"Have you been to the ER, urgent care clinic since your last visit? Hospitalized since your last visit? \" No    2.  \"Have you seen or consulted any other health care providers outside of the [address] [place] [address]

## 2023-10-23 RX ORDER — OMEPRAZOLE 20 MG/1
20 CAPSULE, DELAYED RELEASE ORAL
Qty: 30 CAPSULE | Refills: 0 | Status: SHIPPED | OUTPATIENT
Start: 2023-10-23 | End: 2023-10-24

## 2023-10-23 RX ORDER — ESOMEPRAZOLE MAGNESIUM 20 MG/1
20 FOR SUSPENSION ORAL DAILY
Qty: 90 PACKET | Status: CANCELLED | OUTPATIENT
Start: 2023-10-23

## 2023-10-23 NOTE — TELEPHONE ENCOUNTER
Pt wanted a Rx for Omeprazole. She said she discussed with Ana last week about her acid reflux. She wanted a 90 day supply.

## 2023-10-25 ENCOUNTER — HOSPITAL ENCOUNTER (OUTPATIENT)
Age: 48
Discharge: HOME | End: 2023-10-28

## 2023-10-25 LAB — SENTARA SPECIMEN COLLECTION: NORMAL

## 2023-10-30 ENCOUNTER — TELEPHONE (OUTPATIENT)
Dept: FAMILY MEDICINE CLINIC | Facility: CLINIC | Age: 48
End: 2023-10-30

## 2023-10-31 ENCOUNTER — PATIENT MESSAGE (OUTPATIENT)
Dept: FAMILY MEDICINE CLINIC | Facility: CLINIC | Age: 48
End: 2023-10-31

## 2023-11-07 ENCOUNTER — TELEPHONE (OUTPATIENT)
Dept: FAMILY MEDICINE CLINIC | Facility: CLINIC | Age: 48
End: 2023-11-07

## 2023-11-07 NOTE — TELEPHONE ENCOUNTER
Pt called [facility] for them to reprocess the Rx for Wegovy through insurance since she got approval, but they informed her of the backorder. They told her to ask us about prescribing Ozempic instead.

## 2023-11-07 NOTE — TELEPHONE ENCOUNTER
She will have to see if any pharmacies have it available, if not then she will have to until the back order is resolved. She does not qualify for ozempic because she is not diabetic.

## 2024-02-02 ENCOUNTER — OFFICE VISIT (OUTPATIENT)
Dept: FAMILY MEDICINE CLINIC | Facility: CLINIC | Age: 49
End: 2024-02-02
Payer: MEDICAID

## 2024-02-02 VITALS
OXYGEN SATURATION: 97 % | TEMPERATURE: 97.9 F | RESPIRATION RATE: 18 BRPM | HEIGHT: 64 IN | HEART RATE: 94 BPM | WEIGHT: 185.2 LBS | BODY MASS INDEX: 31.62 KG/M2 | SYSTOLIC BLOOD PRESSURE: 122 MMHG | DIASTOLIC BLOOD PRESSURE: 88 MMHG

## 2024-02-02 DIAGNOSIS — E66.9 CLASS 1 OBESITY WITHOUT SERIOUS COMORBIDITY WITH BODY MASS INDEX (BMI) OF 31.0 TO 31.9 IN ADULT, UNSPECIFIED OBESITY TYPE: Primary | ICD-10-CM

## 2024-02-02 PROCEDURE — 99213 OFFICE O/P EST LOW 20 MIN: CPT | Performed by: NURSE PRACTITIONER

## 2024-02-02 ASSESSMENT — PATIENT HEALTH QUESTIONNAIRE - PHQ9
SUM OF ALL RESPONSES TO PHQ9 QUESTIONS 1 & 2: 0
5. POOR APPETITE OR OVEREATING: 0
7. TROUBLE CONCENTRATING ON THINGS, SUCH AS READING THE NEWSPAPER OR WATCHING TELEVISION: 0
SUM OF ALL RESPONSES TO PHQ QUESTIONS 1-9: 0
SUM OF ALL RESPONSES TO PHQ QUESTIONS 1-9: 0
2. FEELING DOWN, DEPRESSED OR HOPELESS: 0
4. FEELING TIRED OR HAVING LITTLE ENERGY: 0
6. FEELING BAD ABOUT YOURSELF - OR THAT YOU ARE A FAILURE OR HAVE LET YOURSELF OR YOUR FAMILY DOWN: 0
9. THOUGHTS THAT YOU WOULD BE BETTER OFF DEAD, OR OF HURTING YOURSELF: 0
8. MOVING OR SPEAKING SO SLOWLY THAT OTHER PEOPLE COULD HAVE NOTICED. OR THE OPPOSITE, BEING SO FIGETY OR RESTLESS THAT YOU HAVE BEEN MOVING AROUND A LOT MORE THAN USUAL: 0
10. IF YOU CHECKED OFF ANY PROBLEMS, HOW DIFFICULT HAVE THESE PROBLEMS MADE IT FOR YOU TO DO YOUR WORK, TAKE CARE OF THINGS AT HOME, OR GET ALONG WITH OTHER PEOPLE: 0
SUM OF ALL RESPONSES TO PHQ QUESTIONS 1-9: 0
1. LITTLE INTEREST OR PLEASURE IN DOING THINGS: 0
3. TROUBLE FALLING OR STAYING ASLEEP: 0
SUM OF ALL RESPONSES TO PHQ QUESTIONS 1-9: 0

## 2024-02-02 ASSESSMENT — ENCOUNTER SYMPTOMS: SHORTNESS OF BREATH: 0

## 2024-02-02 NOTE — PROGRESS NOTES
Padma Vallecillo presents today for   Chief Complaint   Patient presents with    Follow-up    Weight Management       Is someone accompanying this pt? no    Is the patient using any DME equipment during OV? n    Depression Screenin/2/2024     9:16 AM 10/16/2023    10:36 AM 2023     9:33 AM 10/13/2022    10:51 AM 2021     1:11 PM 2021     9:31 AM 2021     2:12 PM   PHQ-9 Questionaire   Little interest or pleasure in doing things 0 0 0 0 0 0 0   Feeling down, depressed, or hopeless 0 0 0 0 0 0 0   Trouble falling or staying asleep, or sleeping too much 0 0        Feeling tired or having little energy 0 0        Poor appetite or overeating 0 0        Feeling bad about yourself - or that you are a failure or have let yourself or your family down 0 0        Trouble concentrating on things, such as reading the newspaper or watching television 0 0        Moving or speaking so slowly that other people could have noticed. Or the opposite - being so fidgety or restless that you have been moving around a lot more than usual 0 0        Thoughts that you would be better off dead, or of hurting yourself in some way 0 0        PHQ-9 Total Score 0 0 0 0 0 0 0   If you checked off any problems, how difficult have these problems made it for you to do your work, take care of things at home, or get along with other people? 0 0            Fall Risk       No data to display                 Health Maintenance reviewed and discussed and ordered per Provider.    Health Maintenance Due   Topic Date Due    Hepatitis B vaccine (1 of 3 - 3-dose series) Never done    COVID-19 Vaccine (1) Never done   .        \"Have you been to the ER, urgent care clinic since your last visit?  Hospitalized since your last visit?\"    NO    “Have you seen or consulted any other health care providers outside of [facility] since your last visit?”    NO

## 2024-02-02 NOTE — PROGRESS NOTES
Padma Vallecillo is a 48 y.o. female presents with   Chief Complaint   Patient presents with    Follow-up    Weight Management        Diagnosis   1. Class 1 obesity without serious comorbidity with body mass index (BMI) of 31.0 to 31.9 in adult, unspecified obesity type    Patient presents today for 3-month follow-up after I prescribed Wegovy however she states she has been unable to send the medication has not been on it at all.     /88 (Site: Left Upper Arm, Position: Sitting, Cuff Size: Medium Adult)   Pulse 94   Temp 97.9 °F (36.6 °C) (Temporal)   Resp 18   Ht 1.626 m (5' 4\")   Wt 84 kg (185 lb 3.2 oz)   SpO2 97%   BMI 31.79 kg/m²   Subjective:     Past Medical History:   Diagnosis Date    Degeneration of cervical intervertebral disc     Depression     Menopause     Overweight      Past Surgical History:   Procedure Laterality Date    BREAST REDUCTION SURGERY  2021     SECTION      x2    CHOLECYSTECTOMY      COLONOSCOPY N/A 3/29/2023    COLONOSCOPY performed by Jeff Guzmán MD at [facility] ENDOSCOPY    HYSTERECTOMY (CERVIX STATUS UNKNOWN)  2019    partial     Social History     Socioeconomic History    Marital status:      Spouse name: None    Number of children: None    Years of education: None    Highest education level: None   Tobacco Use    Smoking status: Former     Current packs/day: 0.00     Types: Cigarettes     Quit date: 1998     Years since quittin.1    Smokeless tobacco: Never   Substance and Sexual Activity    Alcohol use: Never    Drug use: Never     Social Determinants of Health     Financial Resource Strain: Low Risk  (10/16/2023)    Overall Financial Resource Strain (CARDIA)     Difficulty of Paying Living Expenses: Not very hard   Transportation Needs: Unknown (10/16/2023)    PRAPARE - Transportation     Lack of Transportation (Non-Medical): No   Housing Stability: Unknown (10/16/2023)    Housing Stability Vital Sign     Unstable Housing in the Last

## 2024-02-06 ENCOUNTER — TELEPHONE (OUTPATIENT)
Dept: FAMILY MEDICINE CLINIC | Facility: CLINIC | Age: 49
End: 2024-02-06

## 2024-02-06 RX ORDER — TIRZEPATIDE 2.5 MG/.5ML
2.5 INJECTION, SOLUTION SUBCUTANEOUS
Qty: 2 ML | Refills: 0 | Status: SHIPPED | OUTPATIENT
Start: 2024-02-06

## 2024-02-06 NOTE — TELEPHONE ENCOUNTER
I sent it to the [facility] home delivery that is located in [place]. I am guessing that is the right one, but there were quite a few listed. If there is a different [facility] pharmacy she needs it sent to, then she need to let us know.

## 2024-02-06 NOTE — TELEPHONE ENCOUNTER
Pt called about getting the Zepbound starting dose sent to [facility].

## 2024-05-02 ENCOUNTER — TELEPHONE (OUTPATIENT)
Dept: FAMILY MEDICINE CLINIC | Facility: CLINIC | Age: 49
End: 2024-05-02

## 2024-05-02 NOTE — TELEPHONE ENCOUNTER
----- Message from Cece Purvis sent at 5/1/2024  3:08 PM EDT -----  Patient is wanting to know if her appointment on May 23rd could be virtual?

## 2024-06-11 RX ORDER — OMEPRAZOLE 20 MG/1
20 CAPSULE, DELAYED RELEASE ORAL
Qty: 90 CAPSULE | Refills: 1 | Status: SHIPPED | OUTPATIENT
Start: 2024-06-11

## 2024-06-28 ENCOUNTER — OFFICE VISIT (OUTPATIENT)
Dept: FAMILY MEDICINE CLINIC | Facility: CLINIC | Age: 49
End: 2024-06-28
Payer: MEDICAID

## 2024-06-28 VITALS
DIASTOLIC BLOOD PRESSURE: 73 MMHG | SYSTOLIC BLOOD PRESSURE: 118 MMHG | OXYGEN SATURATION: 97 % | HEART RATE: 82 BPM | BODY MASS INDEX: 30.87 KG/M2 | WEIGHT: 180.8 LBS | TEMPERATURE: 97 F | RESPIRATION RATE: 20 BRPM | HEIGHT: 64 IN

## 2024-06-28 DIAGNOSIS — F41.9 ANXIETY: ICD-10-CM

## 2024-06-28 DIAGNOSIS — E66.09 CLASS 1 OBESITY DUE TO EXCESS CALORIES WITHOUT SERIOUS COMORBIDITY WITH BODY MASS INDEX (BMI) OF 31.0 TO 31.9 IN ADULT: ICD-10-CM

## 2024-06-28 DIAGNOSIS — Z12.31 ENCOUNTER FOR SCREENING MAMMOGRAM FOR MALIGNANT NEOPLASM OF BREAST: Primary | ICD-10-CM

## 2024-06-28 PROCEDURE — 99214 OFFICE O/P EST MOD 30 MIN: CPT | Performed by: NURSE PRACTITIONER

## 2024-06-28 RX ORDER — DIAZEPAM 10 MG/1
TABLET ORAL
Qty: 1 TABLET | Refills: 0 | Status: SHIPPED | OUTPATIENT
Start: 2024-06-28 | End: 2024-06-29

## 2024-06-28 RX ORDER — PHENTERMINE HYDROCHLORIDE 37.5 MG/1
37.5 TABLET ORAL
Qty: 90 TABLET | Refills: 0 | Status: SHIPPED | OUTPATIENT
Start: 2024-06-28 | End: 2024-09-26

## 2024-06-28 ASSESSMENT — PATIENT HEALTH QUESTIONNAIRE - PHQ9
2. FEELING DOWN, DEPRESSED OR HOPELESS: NOT AT ALL
SUM OF ALL RESPONSES TO PHQ QUESTIONS 1-9: 0
SUM OF ALL RESPONSES TO PHQ QUESTIONS 1-9: 0
1. LITTLE INTEREST OR PLEASURE IN DOING THINGS: NOT AT ALL
SUM OF ALL RESPONSES TO PHQ QUESTIONS 1-9: 0
SUM OF ALL RESPONSES TO PHQ QUESTIONS 1-9: 0
SUM OF ALL RESPONSES TO PHQ9 QUESTIONS 1 & 2: 0

## 2024-06-28 ASSESSMENT — ENCOUNTER SYMPTOMS: SHORTNESS OF BREATH: 0

## 2024-06-28 NOTE — PROGRESS NOTES
Padma Vallecillo is a 48 y.o. female presents with   Chief Complaint   Patient presents with    Follow-up     Discuss weight loss        Diagnosis   1. Encounter for screening mammogram for malignant neoplasm of breast       2. Class 1 obesity due to excess calories without serious comorbidity with body mass index (BMI) of 31.0 to 31.9 in adult    Patient's BMI of 31.03 with a weight of 180 pounds states that she has tried dietary and lifestyle modifications with little to no success in weight loss.  She goes on to add that her insurance would cover the GLP 1 injectables however she was unable to locate the medications.   3. Anxiety    Patient relates that she had a traumatic experience with previous mammogram and states that she would prefer to forego any further mammo's.     /73   Pulse 82   Temp 97 °F (36.1 °C) (Temporal)   Resp 20   Ht 1.626 m (5' 4\")   Wt 82 kg (180 lb 12.8 oz)   SpO2 97%   BMI 31.03 kg/m²   Subjective:     Past Medical History:   Diagnosis Date    Degeneration of cervical intervertebral disc     Depression     Menopause     Overweight      Past Surgical History:   Procedure Laterality Date    BREAST REDUCTION SURGERY  2021     SECTION      x2    CHOLECYSTECTOMY      COLONOSCOPY N/A 3/29/2023    COLONOSCOPY performed by Jeff Guzmán MD at [facility] ENDOSCOPY    HYSTERECTOMY (CERVIX STATUS UNKNOWN)  2019    partial     Social History     Socioeconomic History    Marital status:      Spouse name: None    Number of children: None    Years of education: None    Highest education level: None   Tobacco Use    Smoking status: Former     Current packs/day: 0.00     Types: Cigarettes     Quit date: 1998     Years since quittin.5    Smokeless tobacco: Never   Substance and Sexual Activity    Alcohol use: Never    Drug use: Never     Social Determinants of Health     Financial Resource Strain: Low Risk  (10/16/2023)    Overall Financial Resource Strain (CARDIA)

## 2024-06-28 NOTE — PROGRESS NOTES
Chief Complaint   Patient presents with    Follow-up     Discuss weight loss     Patient could not get Wegovy nor Zepbound, here today to discuss weight loss medications.  \"Have you been to the ER, urgent care clinic since your last visit?  Hospitalized since your last visit?\"    NO    “Have you seen or consulted any other health care providers outside of [facility] since your last visit?”    NO       Have you had a mammogram?”   NO

## 2024-07-11 ENCOUNTER — TELEPHONE (OUTPATIENT)
Dept: FAMILY MEDICINE CLINIC | Facility: CLINIC | Age: 49
End: 2024-07-11

## 2024-07-11 DIAGNOSIS — N64.4 BREAST PAIN, RIGHT: Primary | ICD-10-CM

## 2024-07-11 NOTE — TELEPHONE ENCOUNTER
Patient called in to schedule her screening mammogram and while on the phone with them she explained she had been experiencing right breast pain. They requested we resubmit a diagnostic Mammo order with us if needed . Orders placed. trevor Carvalho

## 2024-08-02 ENCOUNTER — HOSPITAL ENCOUNTER (EMERGENCY)
Age: 49
Discharge: HOME | End: 2024-08-02
Attending: EMERGENCY MEDICINE
Payer: MEDICAID

## 2024-08-02 VITALS
HEART RATE: 98 BPM | TEMPERATURE: 98.1 F | SYSTOLIC BLOOD PRESSURE: 152 MMHG | OXYGEN SATURATION: 99 % | HEIGHT: 64 IN | DIASTOLIC BLOOD PRESSURE: 97 MMHG | WEIGHT: 170 LBS | BODY MASS INDEX: 29.02 KG/M2 | RESPIRATION RATE: 17 BRPM

## 2024-08-02 DIAGNOSIS — T67.9XXA HEAT EXPOSURE, INITIAL ENCOUNTER: Primary | ICD-10-CM

## 2024-08-02 DIAGNOSIS — E86.0 DEHYDRATION: ICD-10-CM

## 2024-08-02 LAB
ALBUMIN SERPL-MCNC: 3.5 G/DL (ref 3.4–5)
ALBUMIN/GLOB SERPL: 0.9 (ref 0.8–1.7)
ALP SERPL-CCNC: 91 U/L (ref 45–117)
ALT SERPL-CCNC: 40 U/L (ref 13–56)
ANION GAP SERPL CALC-SCNC: 4 MMOL/L (ref 3–18)
APPEARANCE UR: CLEAR
AST SERPL W P-5'-P-CCNC: 17 U/L (ref 10–38)
BASOPHILS # BLD: 0.1 K/UL (ref 0–0.1)
BASOPHILS NFR BLD: 1 % (ref 0–2)
BILIRUB SERPL-MCNC: 0.3 MG/DL (ref 0.2–1)
BILIRUB UR QL: NEGATIVE
BUN SERPL-MCNC: 10 MG/DL (ref 7–18)
BUN/CREAT SERPL: 12 (ref 12–20)
CHLORIDE SERPL-SCNC: 107 MMOL/L (ref 100–111)
CK SERPL-CCNC: 86 U/L (ref 26–192)
CO2 SERPL-SCNC: 26 MMOL/L (ref 21–32)
COLOR UR: YELLOW
CREAT SERPL-MCNC: 0.86 MG/DL (ref 0.6–1.3)
DIFFERENTIAL METHOD BLD: NORMAL
EOSINOPHIL # BLD: 0.2 K/UL (ref 0–0.4)
EOSINOPHIL NFR BLD: 2 % (ref 0–5)
ERYTHROCYTE [DISTWIDTH] IN BLOOD BY AUTOMATED COUNT: 12.3 % (ref 11.6–14.5)
GLOBULIN SER CALC-MCNC: 3.8 G/DL (ref 2–4)
GLUCOSE SERPL-MCNC: 104 MG/DL (ref 74–99)
GLUCOSE UR STRIP.AUTO-MCNC: NEGATIVE MG/DL
HCT VFR BLD AUTO: 38.7 % (ref 35–45)
HGB BLD-MCNC: 13.5 G/DL (ref 12–16)
HGB UR QL STRIP: NEGATIVE
IMM GRANULOCYTES # BLD AUTO: 0 K/UL (ref 0–0.04)
IMM GRANULOCYTES NFR BLD AUTO: 0 % (ref 0–0.5)
KETONES UR QL STRIP.AUTO: NEGATIVE MG/DL
LEUKOCYTE ESTERASE UR QL STRIP.AUTO: NEGATIVE
LYMPHOCYTES # BLD: 3 K/UL (ref 0.9–3.6)
LYMPHOCYTES NFR BLD: 28 % (ref 21–52)
MAGNESIUM SERPL-MCNC: 1.9 MG/DL (ref 1.6–2.6)
MCH RBC QN AUTO: 31.8 PG (ref 24–34)
MCHC RBC AUTO-ENTMCNC: 34.9 G/DL (ref 31–37)
MCV RBC AUTO: 91.1 FL (ref 78–100)
MONOCYTES # BLD: 0.8 K/UL (ref 0.05–1.2)
MONOCYTES NFR BLD: 7 % (ref 3–10)
NEUTS SEG # BLD: 6.7 K/UL (ref 1.8–8)
NEUTS SEG NFR BLD: 62 % (ref 40–73)
NITRITE UR QL STRIP.AUTO: NEGATIVE
NRBC # BLD: 0 K/UL (ref 0–0.01)
NRBC BLD-RTO: 0 PER 100 WBC
PH UR STRIP: 5.5 (ref 5–8)
PLATELET # BLD AUTO: 290 K/UL (ref 135–420)
PMV BLD AUTO: 9.7 FL (ref 9.2–11.8)
POTASSIUM SERPL-SCNC: 3.7 MMOL/L (ref 3.5–5.5)
PROT SERPL-MCNC: 7.3 G/DL (ref 6.4–8.2)
PROT UR STRIP-MCNC: NEGATIVE MG/DL
RBC # BLD AUTO: 4.25 M/UL (ref 4.2–5.3)
SODIUM SERPL-SCNC: 137 MMOL/L (ref 136–145)
SP GR UR REFRACTOMETRY: 1.01 (ref 1–1.03)
UROBILINOGEN UR QL STRIP.AUTO: 0.2 EU/DL (ref 0.2–1)
WBC # BLD AUTO: 10.7 K/UL (ref 4.6–13.2)

## 2024-08-02 PROCEDURE — 80053 COMPREHEN METABOLIC PANEL: CPT

## 2024-08-02 PROCEDURE — 99284 EMERGENCY DEPT VISIT MOD MDM: CPT

## 2024-08-02 PROCEDURE — 83735 ASSAY OF MAGNESIUM: CPT

## 2024-08-02 PROCEDURE — 85025 COMPLETE CBC W/AUTO DIFF WBC: CPT

## 2024-08-02 PROCEDURE — 81003 URINALYSIS AUTO W/O SCOPE: CPT

## 2024-08-02 PROCEDURE — 96360 HYDRATION IV INFUSION INIT: CPT

## 2024-08-02 PROCEDURE — 82550 ASSAY OF CK (CPK): CPT

## 2024-08-02 PROCEDURE — 2580000003 HC RX 258: Performed by: EMERGENCY MEDICINE

## 2024-08-02 RX ORDER — ONDANSETRON 4 MG/1
4 TABLET, ORALLY DISINTEGRATING ORAL 3 TIMES DAILY PRN
Qty: 21 TABLET | Refills: 0 | Status: SHIPPED | OUTPATIENT
Start: 2024-08-02

## 2024-08-02 RX ORDER — 0.9 % SODIUM CHLORIDE 0.9 %
1000 INTRAVENOUS SOLUTION INTRAVENOUS ONCE
Status: COMPLETED | OUTPATIENT
Start: 2024-08-02 | End: 2024-08-02

## 2024-08-02 RX ADMIN — SODIUM CHLORIDE 1000 ML: 9 INJECTION, SOLUTION INTRAVENOUS at 17:07

## 2024-08-02 ASSESSMENT — PAIN SCALES - GENERAL: PAINLEVEL_OUTOF10: 6

## 2024-08-02 ASSESSMENT — LIFESTYLE VARIABLES
HOW OFTEN DO YOU HAVE A DRINK CONTAINING ALCOHOL: 2-3 TIMES A WEEK
HOW MANY STANDARD DRINKS CONTAINING ALCOHOL DO YOU HAVE ON A TYPICAL DAY: 1 OR 2

## 2024-08-02 ASSESSMENT — PAIN - FUNCTIONAL ASSESSMENT: PAIN_FUNCTIONAL_ASSESSMENT: 0-10

## 2024-08-02 ASSESSMENT — PAIN DESCRIPTION - PAIN TYPE: TYPE: ACUTE PAIN

## 2024-08-02 ASSESSMENT — PAIN DESCRIPTION - LOCATION: LOCATION: HEAD

## 2024-08-02 ASSESSMENT — PAIN DESCRIPTION - ORIENTATION: ORIENTATION: ANTERIOR

## 2024-08-02 NOTE — ED TRIAGE NOTES
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

## 2024-08-02 NOTE — ED PROVIDER NOTES
have been made to catch any discrepancies, it may contain sound alike words, grammatical errors, or nonsensical words.           37.5 MG tablet  Commonly known as: ADIPEX-P  Take 1 tablet by mouth every morning (before breakfast) for 90 days. Max Daily Amount: 37.5 mg               Where to Get Your Medications        These medications were sent to [id number] - [place], [place] - [address] [place] [address] DR Nicko SCHOFIELD [phone] - F [phone]  [address] [place] [address] [place] [address]      Phone: [phone]   ondansetron 4 MG disintegrating tablet       3. [unfilled]  _______________________________    This note was partially transcribed via voice recognition software. Although efforts have been made to catch any discrepancies, it may contain sound alike words, grammatical errors, or nonsensical words.             Gil Sheridan MD  08/05/24 3881

## 2024-08-02 NOTE — DISCHARGE INSTRUCTIONS
Recommend holding phentermine until at least Monday, August 5.  Copious IV fluids, continue Prilosec for reflux as well as medicines for nausea vomiting

## 2024-08-20 ENCOUNTER — TELEPHONE (OUTPATIENT)
Facility: CLINIC | Age: 49
End: 2024-08-20

## 2024-08-20 NOTE — TELEPHONE ENCOUNTER
Pt called via nurse triage.  She said that she's been dealing with dizziness and fatigue.  She went to the ER three weeks ago because she was in an area with no A/C and thought that she was getting dehydrated.    She said over this past weekend she felt really sick.  She said she has a constant stomach ache, can't hardly eat any solid meals, and she has pain underneath her feet.    Ana doesn't have anything available this week.  I didn't know if she needed to go to the ER again or not.  I don't know if you need to call her and triage.

## 2024-10-15 ENCOUNTER — OFFICE VISIT (OUTPATIENT)
Facility: CLINIC | Age: 49
End: 2024-10-15
Payer: MEDICAID

## 2024-10-15 VITALS
TEMPERATURE: 97.4 F | BODY MASS INDEX: 27.83 KG/M2 | HEIGHT: 64 IN | OXYGEN SATURATION: 98 % | SYSTOLIC BLOOD PRESSURE: 138 MMHG | HEART RATE: 82 BPM | WEIGHT: 163 LBS | RESPIRATION RATE: 18 BRPM | DIASTOLIC BLOOD PRESSURE: 88 MMHG

## 2024-10-15 DIAGNOSIS — E55.9 VITAMIN D DEFICIENCY: ICD-10-CM

## 2024-10-15 DIAGNOSIS — Z13.21 ENCOUNTER FOR VITAMIN DEFICIENCY SCREENING: ICD-10-CM

## 2024-10-15 DIAGNOSIS — Z12.31 ENCOUNTER FOR SCREENING MAMMOGRAM FOR MALIGNANT NEOPLASM OF BREAST: Primary | ICD-10-CM

## 2024-10-15 DIAGNOSIS — F41.9 ANXIETY: ICD-10-CM

## 2024-10-15 DIAGNOSIS — Z13.29 THYROID DISORDER SCREENING: ICD-10-CM

## 2024-10-15 DIAGNOSIS — Z13.1 SCREENING FOR DIABETES MELLITUS: ICD-10-CM

## 2024-10-15 DIAGNOSIS — Z13.220 SCREENING FOR CHOLESTEROL LEVEL: ICD-10-CM

## 2024-10-15 PROCEDURE — 99213 OFFICE O/P EST LOW 20 MIN: CPT | Performed by: NURSE PRACTITIONER

## 2024-10-15 SDOH — ECONOMIC STABILITY: FOOD INSECURITY: WITHIN THE PAST 12 MONTHS, YOU WORRIED THAT YOUR FOOD WOULD RUN OUT BEFORE YOU GOT MONEY TO BUY MORE.: NEVER TRUE

## 2024-10-15 SDOH — ECONOMIC STABILITY: FOOD INSECURITY: WITHIN THE PAST 12 MONTHS, THE FOOD YOU BOUGHT JUST DIDN'T LAST AND YOU DIDN'T HAVE MONEY TO GET MORE.: NEVER TRUE

## 2024-10-15 SDOH — ECONOMIC STABILITY: INCOME INSECURITY: HOW HARD IS IT FOR YOU TO PAY FOR THE VERY BASICS LIKE FOOD, HOUSING, MEDICAL CARE, AND HEATING?: NOT VERY HARD

## 2024-10-15 NOTE — PROGRESS NOTES
Chief Complaint   Patient presents with    New Patient     Establish care, requests labs done       \"Have you been to the ER, urgent care clinic since your last visit?  Hospitalized since your last visit?\"    NO    “Have you seen or consulted any other health care providers outside of [facility] since your last visit?”    NO       Have you had a mammogram?”   NO    Date of last Mammogram: 6/14/2021

## 2024-10-15 NOTE — PROGRESS NOTES
Padma Vallecillo (: 1975) is a 49 y.o. female patient, here for evaluation of the following chief complaint(s):  New Patient (Establish care, requests labs done)       ASSESSMENT/PLAN:  Assessment & Plan  Vitamin D deficiency       Orders:    Vitamin D 25 Hydroxy; Future    Encounter for screening mammogram for malignant neoplasm of breast  Patient did have a pending order for a diagnostic ultrasound and mammogram she states that she canceled this she had had some atypical pain in her breast and this did resolve completely she stated she believes it was musculoskeletal  Patient states she has never had a breast biopsy or history of breast cancer  Patient states normally she gets screening mammograms      Orders:    NEETA ANNA DIGITAL SCREEN BILATERAL; Future    Screening for cholesterol level       Orders:    Lipid Panel; Future    Screening for diabetes mellitus            Thyroid disorder screening       Orders:    TSH; Future    Encounter for vitamin deficiency screening       Orders:    Vitamin B12; Future                 Return in about 2 weeks (around 10/29/2024).  Lab work ordered 2-week follow-up      SUBJECTIVE/OBJECTIVE:    49-year-old female presents to establish care  Patient does not want a flu vaccine today  Patient states she had a partial hysterectomy 5 years ago due to heavy menstruations.  Patient states about 6 months ago she had a colonoscopy and was told she should repeat in 10 years.  Patient has no acute concerns today such as fever chills sweats nausea vomiting abdominal pain diarrhea chest pain shortness of breath.  Patient states she is due for her screening mammogram, noted there was an order for diagnostic mammogram, patient states this was placed because she was having an atypical chest pain.  She states is completely resolved and she does not have any incidence of this discomfort                /88 (Site: Left Upper Arm, Position: Sitting)   Pulse 82   Temp 97.4 °F (36.3

## 2024-10-31 ENCOUNTER — HOSPITAL ENCOUNTER (OUTPATIENT)
Age: 49
Discharge: HOME | End: 2024-11-03
Payer: MEDICAID

## 2024-10-31 VITALS — WEIGHT: 162 LBS | HEIGHT: 64 IN | BODY MASS INDEX: 27.66 KG/M2

## 2024-10-31 DIAGNOSIS — Z12.31 ENCOUNTER FOR SCREENING MAMMOGRAM FOR MALIGNANT NEOPLASM OF BREAST: ICD-10-CM

## 2024-10-31 PROCEDURE — 77063 BREAST TOMOSYNTHESIS BI: CPT

## 2024-11-04 DIAGNOSIS — R92.8 ABNORMAL MAMMOGRAM OF RIGHT BREAST: Primary | ICD-10-CM

## 2024-11-04 NOTE — RESULT ENCOUNTER NOTE
Recommended that the patient needs additional imaging of the right breast  I will place these orders please let her know she needs to schedule if they do not contact her to schedule give her the scheduling number please thank you so much

## 2024-11-04 NOTE — RESULT ENCOUNTER NOTE
Patient returned call to office. Notified of mammogram results and orders for additional views. Contact information for scheduling given.

## 2024-11-27 ENCOUNTER — HOSPITAL ENCOUNTER (OUTPATIENT)
Age: 49
Setting detail: SPECIMEN
Discharge: HOME | End: 2024-11-30

## 2024-11-27 LAB — SENTARA SPECIMEN COLLECTION: NORMAL

## 2024-11-27 PROCEDURE — 99001 SPECIMEN HANDLING PT-LAB: CPT

## 2024-11-28 LAB
A/G RATIO: 1.7 RATIO (ref 1.1–2.6)
ALBUMIN: 4.5 G/DL (ref 3.5–5)
ALP BLD-CCNC: 104 U/L (ref 25–115)
ALT SERPL-CCNC: 24 U/L (ref 5–40)
ANION GAP SERPL CALCULATED.3IONS-SCNC: 15 MMOL/L (ref 3–15)
AST SERPL-CCNC: 16 U/L (ref 10–37)
BASOPHILS ABSOLUTE: 0.1 K/UL (ref 0–0.2)
BASOPHILS RELATIVE PERCENT: 1 % (ref 0–2)
BILIRUB SERPL-MCNC: 0.2 MG/DL (ref 0.2–1.2)
BUN BLDV-MCNC: 10 MG/DL (ref 6–22)
CALCIUM SERPL-MCNC: 9.4 MG/DL (ref 8.4–10.5)
CHLORIDE BLD-SCNC: 101 MMOL/L (ref 98–110)
CHOLESTEROL, TOTAL: 187 MG/DL (ref 110–200)
CHOLESTEROL/HDL RATIO: 3.5 (ref 0–5)
CO2: 23 MMOL/L (ref 20–32)
CREAT SERPL-MCNC: 0.6 MG/DL (ref 0.5–1.2)
EOSINOPHIL # BLD: 4 % (ref 0–6)
EOSINOPHILS ABSOLUTE: 0.4 K/UL (ref 0–0.5)
GFR, ESTIMATED: >60 ML/MIN/1.73 SQ.M.
GLOBULIN: 2.7 G/DL (ref 2–4)
GLUCOSE: 94 MG/DL (ref 70–99)
HCT VFR BLD CALC: 44.1 % (ref 35.1–48)
HDLC SERPL-MCNC: 53 MG/DL
HEMOGLOBIN: 14.6 G/DL (ref 11.7–16)
LDL CHOLESTEROL: 118 MG/DL (ref 50–99)
LDL/HDL RATIO: 2.2
LYMPHOCYTES # BLD: 45 % (ref 20–45)
LYMPHOCYTES ABSOLUTE: 3.5 K/UL (ref 1–4.8)
MCH RBC QN AUTO: 31 PG (ref 26–34)
MCHC RBC AUTO-ENTMCNC: 33 G/DL (ref 31–36)
MCV RBC AUTO: 94 FL (ref 80–99)
MONOCYTES ABSOLUTE: 0.5 K/UL (ref 0.1–1)
MONOCYTES: 6 % (ref 3–12)
NEUTROPHILS ABSOLUTE: 3.4 K/UL (ref 1.8–7.7)
NEUTROPHILS: 44 % (ref 40–75)
NON-HDL CHOLESTEROL: 134 MG/DL
PDW BLD-RTO: 12.1 % (ref 10–15.5)
PLATELET # BLD: 339 K/UL (ref 140–440)
PMV BLD AUTO: 10.2 FL (ref 9–13)
POTASSIUM SERPL-SCNC: 4.4 MMOL/L (ref 3.5–5.5)
RBC # BLD: 4.71 M/UL (ref 3.8–5.2)
SODIUM BLD-SCNC: 139 MMOL/L (ref 133–145)
TOTAL PROTEIN: 7.2 G/DL (ref 6.4–8.3)
TRIGL SERPL-MCNC: 80 MG/DL (ref 40–149)
TSH SERPL DL<=0.05 MIU/L-ACNC: 2.4 MCU/ML (ref 0.27–4.2)
VITAMIN B-12: 744 PG/ML (ref 211–911)
VITAMIN D 25-HYDROXY: 23.3 NG/ML (ref 32–100)
VLDLC SERPL CALC-MCNC: 16 MG/DL (ref 8–30)
WBC # BLD: 7.9 K/UL (ref 4–11)

## 2024-12-10 ENCOUNTER — TELEMEDICINE (OUTPATIENT)
Facility: CLINIC | Age: 49
End: 2024-12-10
Payer: MEDICAID

## 2024-12-10 DIAGNOSIS — E55.9 VITAMIN D DEFICIENCY: ICD-10-CM

## 2024-12-10 DIAGNOSIS — F41.9 ANXIETY: Primary | ICD-10-CM

## 2024-12-10 DIAGNOSIS — F41.8 SITUATIONAL ANXIETY: ICD-10-CM

## 2024-12-10 PROCEDURE — 99213 OFFICE O/P EST LOW 20 MIN: CPT | Performed by: NURSE PRACTITIONER

## 2024-12-10 RX ORDER — ALPRAZOLAM 0.25 MG/1
0.25 TABLET ORAL 3 TIMES DAILY PRN
Qty: 42 TABLET | Refills: 0 | Status: SHIPPED | OUTPATIENT
Start: 2024-12-10 | End: 2024-12-24

## 2024-12-10 RX ORDER — CITALOPRAM HYDROBROMIDE 10 MG/1
10 TABLET ORAL DAILY
Qty: 90 TABLET | Refills: 0 | Status: SHIPPED | OUTPATIENT
Start: 2024-12-10

## 2024-12-10 NOTE — PROGRESS NOTES
Chief Complaint   Patient presents with    Other     Discuss anxiety medication       \"Have you been to the ER, urgent care clinic since your last visit?  Hospitalized since your last visit?\"    NO    “Have you seen or consulted any other health care providers outside our system since your last visit?”    NO

## 2024-12-10 NOTE — PROGRESS NOTES
Padma Vallecillo, was evaluated through a synchronous (real-time) audio-video encounter. The patient (or guardian if applicable) is aware that this is a billable service, which includes applicable co-pays. This Virtual Visit was conducted with patient's (and/or legal guardian's) consent. Patient identification was verified, and a caregiver was present when appropriate.   The patient was located at Home: [id number] [place] [id number]  Provider was located at Facility (Appt Dept): [address] [place] [id number]  Confirm you are appropriately licensed, registered, or certified to deliver care in the state where the patient is located as indicated above. If you are not or unsure, please re-schedule the visit: Yes, I confirm.     Padma Vallecillo (:  1975) is a Established patient, presenting virtually for evaluation of the following: Review of labs, patient would like to discuss situational anxiety      Below is the assessment and plan developed based on review of pertinent history, physical exam, labs, studies, and medications.     Assessment & Plan  Anxiety  Patient I discussed plan of care  Patient should start citalopram 10 mg and take daily patient is aware this medicine can take up to 2 to 4 weeks to become effective for anxiety/depression  Take medication as prescribed, do not stop taking unless instructed.  Understands that medication may take from 8-12 weeks to take full effect.  May experience mild side effects, such as nausea, headache, stomach upset in the first 1-2 weeks of taking the medication but usually subside by week 2.  If medication causes drowsiness, switch to night time.  If side effects continue beyond 3 weeks, schedule a sooner appointment, otherwise follow-up in 4 weeks as instructed.  Medication may cause suicidal ideations and if this occurs, report to the nearest ER.         Patient may utilize Xanax 0.25 mg 3 times a day as needed for severe anxiety.  We

## 2025-01-05 ENCOUNTER — HOSPITAL ENCOUNTER (EMERGENCY)
Age: 50
Discharge: HOME | End: 2025-01-05
Attending: EMERGENCY MEDICINE
Payer: MEDICAID

## 2025-01-05 VITALS
TEMPERATURE: 97.9 F | HEART RATE: 78 BPM | DIASTOLIC BLOOD PRESSURE: 87 MMHG | HEIGHT: 64 IN | RESPIRATION RATE: 17 BRPM | WEIGHT: 155 LBS | SYSTOLIC BLOOD PRESSURE: 134 MMHG | BODY MASS INDEX: 26.46 KG/M2 | OXYGEN SATURATION: 99 %

## 2025-01-05 DIAGNOSIS — B34.9 VIRAL ILLNESS: ICD-10-CM

## 2025-01-05 DIAGNOSIS — H66.91 ACUTE RIGHT OTITIS MEDIA: Primary | ICD-10-CM

## 2025-01-05 LAB
FLUAV RNA SPEC QL NAA+PROBE: NOT DETECTED
FLUBV RNA SPEC QL NAA+PROBE: NOT DETECTED
S PYO DNA THROAT QL NAA+PROBE: NOT DETECTED
SARS-COV-2 RNA RESP QL NAA+PROBE: NOT DETECTED

## 2025-01-05 PROCEDURE — 99283 EMERGENCY DEPT VISIT LOW MDM: CPT

## 2025-01-05 PROCEDURE — 87636 SARSCOV2 & INF A&B AMP PRB: CPT

## 2025-01-05 PROCEDURE — 87651 STREP A DNA AMP PROBE: CPT

## 2025-01-05 RX ORDER — BENZONATATE 200 MG/1
200 CAPSULE ORAL 3 TIMES DAILY PRN
Qty: 30 CAPSULE | Refills: 0 | Status: SHIPPED | OUTPATIENT
Start: 2025-01-05 | End: 2025-01-15

## 2025-01-05 RX ORDER — AMOXICILLIN 875 MG/1
875 TABLET, COATED ORAL 2 TIMES DAILY
Qty: 20 TABLET | Refills: 0 | Status: SHIPPED | OUTPATIENT
Start: 2025-01-05 | End: 2025-01-15

## 2025-01-05 RX ORDER — FLUTICASONE PROPIONATE 50 MCG
2 SPRAY, SUSPENSION (ML) NASAL DAILY
Qty: 16 G | Refills: 0 | Status: SHIPPED | OUTPATIENT
Start: 2025-01-05

## 2025-01-05 ASSESSMENT — PAIN DESCRIPTION - LOCATION: LOCATION: THROAT

## 2025-01-05 ASSESSMENT — LIFESTYLE VARIABLES
HOW MANY STANDARD DRINKS CONTAINING ALCOHOL DO YOU HAVE ON A TYPICAL DAY: 1 OR 2
HOW OFTEN DO YOU HAVE A DRINK CONTAINING ALCOHOL: 4 OR MORE TIMES A WEEK

## 2025-01-05 ASSESSMENT — PAIN DESCRIPTION - PAIN TYPE: TYPE: ACUTE PAIN

## 2025-01-05 ASSESSMENT — PAIN - FUNCTIONAL ASSESSMENT: PAIN_FUNCTIONAL_ASSESSMENT: 0-10

## 2025-01-05 ASSESSMENT — PAIN SCALES - GENERAL: PAINLEVEL_OUTOF10: 6

## 2025-01-05 NOTE — ED PROVIDER NOTES
[facility] EMERGENCY DEPT  EMERGENCY DEPARTMENT ENCOUNTER    Patient Name: Padma Vallecillo  MRN: [id number]  YOB: 1975  Provider: Guevara Dunne DO  PCP: Dayana Jacobson FNP   Time/Date of evaluation: 10:45 AM EST on 25    History of Presenting Illness     History Provided by: Patient  History is limited by: Nothing     HISTORY:   Padma Vallecillo is a 49 y.o. female presents to the ED with a chief complaint nasal congestion, bilateral ear pain, mild sore throat.  Patient states that her symptoms began 1 week ago.  She was escorted to the ED by her  who states that he has been having similar symptoms for 2 weeks.  She denies any fever, chills, productive cough, abdominal pain, chest pain, shortness of breath, nausea, vomiting, diarrhea, rash, change in voice, flank pain, dysuria, or hematuria.    Nursing Notes were all reviewed and agreed with or any disagreements were addressed in the HPI.    Past History     PAST MEDICAL HISTORY:  Past Medical History:   Diagnosis Date    Degeneration of cervical intervertebral disc     Depression     Menopause     Overweight        PAST SURGICAL HISTORY:  Past Surgical History:   Procedure Laterality Date    BREAST REDUCTION SURGERY  2021     SECTION      x2    CHOLECYSTECTOMY      COLONOSCOPY N/A 2023    COLONOSCOPY performed by Jeff Guzmán MD at [facility] ENDOSCOPY    HYSTERECTOMY (CERVIX STATUS UNKNOWN)  2019    partial       FAMILY HISTORY:  Family History   Problem Relation Age of Onset    No Known Problems Mother     Alcohol Abuse Father        SOCIAL HISTORY:  Social History     Tobacco Use    Smoking status: Former     Current packs/day: 0.00     Types: Cigarettes     Quit date: 1998     Years since quittin.0    Smokeless tobacco: Never   Vaping Use    Vaping status: Never Used   Substance Use Topics    Alcohol use: Yes    Drug use: Never       MEDICATIONS:  No current facility-administered medications for this

## 2025-01-08 ENCOUNTER — HOSPITAL ENCOUNTER (OUTPATIENT)
Facility: HOSPITAL | Age: 50
Discharge: HOME | End: 2025-01-11
Payer: MEDICAID

## 2025-01-08 VITALS — HEIGHT: 64 IN | WEIGHT: 160 LBS | BODY MASS INDEX: 27.31 KG/M2

## 2025-01-08 DIAGNOSIS — R92.8 ABNORMAL MAMMOGRAM OF RIGHT BREAST: ICD-10-CM

## 2025-01-08 PROCEDURE — 76642 ULTRASOUND BREAST LIMITED: CPT

## 2025-01-08 PROCEDURE — G0279 TOMOSYNTHESIS, MAMMO: HCPCS

## 2025-01-12 DIAGNOSIS — R92.8 ABNORMAL MAMMOGRAM: Primary | ICD-10-CM

## 2025-06-03 ENCOUNTER — RESULTS FOLLOW-UP (OUTPATIENT)
Facility: CLINIC | Age: 50
End: 2025-06-03

## 2025-06-03 NOTE — RESULT ENCOUNTER NOTE
Called patient on 1-15-25, patient aware of results and to schedule 6 month follow up and bilateral diagnostic mammograms at 12 months. Do you want me to call her and see if she has scheduled yet?

## 2025-07-27 NOTE — RESULT ENCOUNTER NOTE
Please call the patient  This call would be a reminder that she has order placed, that was available July 7 for her to get a right breast diagnostic mammogram  Please see if the patient is going to schedule this appointment as was indicated from previous result

## (undated) DEVICE — ENDOGATOR TUBING FOR BOSTON SCIENTIFIC ENDOSTAT II PUMP, OLYMPUS OFP PUMP OR ENDO STRATUS PUMP: Brand: ENDOGATOR

## (undated) DEVICE — TUBING, SUCTION, 9/32" X 10', STRAIGHT: Brand: MEDLINE

## (undated) DEVICE — SOLUTION IRRIG 1000ML STRL H2O USP PLAS POUR BTL

## (undated) DEVICE — TUBING INSUFFLATION CAP W/ EXT CARBON DIOX ENDO SMARTCAP

## (undated) DEVICE — SOLUTION IRRIG 500ML STRL H2O NONPYROGENIC

## (undated) DEVICE — KIT COLON W/ 1.1OZ LUB AND 2 END

## (undated) DEVICE — Device: Brand: DEFENDO VALVE AND CONNECTOR KIT

## (undated) DEVICE — SYRINGE CATH TIP 50ML